# Patient Record
Sex: FEMALE | URBAN - METROPOLITAN AREA
[De-identification: names, ages, dates, MRNs, and addresses within clinical notes are randomized per-mention and may not be internally consistent; named-entity substitution may affect disease eponyms.]

---

## 2020-03-20 ENCOUNTER — HOSPITAL ENCOUNTER (EMERGENCY)
Facility: HOSPITAL | Age: 40
Discharge: HOME | End: 2020-03-20
Attending: EMERGENCY MEDICINE | Admitting: EMERGENCY MEDICINE
Payer: COMMERCIAL

## 2020-03-20 VITALS
TEMPERATURE: 97.9 F | DIASTOLIC BLOOD PRESSURE: 64 MMHG | HEART RATE: 70 BPM | OXYGEN SATURATION: 99 % | SYSTOLIC BLOOD PRESSURE: 108 MMHG | RESPIRATION RATE: 16 BRPM

## 2020-03-20 DIAGNOSIS — J06.9 VIRAL URI: Primary | ICD-10-CM

## 2020-03-20 PROCEDURE — 99283 EMERGENCY DEPT VISIT LOW MDM: CPT | Performed by: PHYSICIAN ASSISTANT

## 2020-03-20 PROCEDURE — 99283 EMERGENCY DEPT VISIT LOW MDM: CPT

## 2020-03-21 NOTE — DISCHARGE INSTRUCTIONS
[address]    Your healthcare provider and/or public health staff have evaluated you and have determined that you do not need to be hospitalized at this time  At this time you can be isolated at home where you will be monitored by staff from your local or state health department  You should carefully follow the prevention and isolation steps below until a healthcare provider or local or state health department says that you can return to your normal activities  Stay home except to get medical care    People who are mildly ill with COVID-19 are able to isolate at home during their illness  You should restrict activities outside your home, except for getting medical care  Do not go to work, school, or public areas  Avoid using public transportation, ride-sharing, or taxis  Separate yourself from other people and animals in your home    People: As much as possible, you should stay in a specific room and away from other people in your home  Also, you should use a separate bathroom, if available  Animals: You should restrict contact with pets and other animals while you are sick with COVID-19, just like you would around other people  Although there have not been reports of pets or other animals becoming sick with COVID-19, it is still recommended that people sick with COVID-19 limit contact with animals until more information is known about the virus  When possible, have another member of your household care for your animals while you are sick  If you are sick with COVID-19, avoid contact with your pet, including petting, snuggling, being kissed or licked, and sharing food  If you must care for your pet or be around animals while you are sick, wash your hands before and after you interact with pets and wear a facemask  See COVID-19 and Animals for more information      Call ahead before visiting your doctor    If you have a medical appointment, call the healthcare provider and tell them that you have or may have COVID-19  This will help the healthcare providers office take steps to keep other people from getting infected or exposed  Wear a facemask    You should wear a facemask when you are around other people (e g , sharing a room or vehicle) or pets and before you enter a healthcare providers office  If you are not able to wear a facemask (for example, because it causes trouble breathing), then people who live with you should not stay in the same room with you, or they should wear a facemask if they enter your room  Cover your coughs and sneezes    Cover your mouth and nose with a tissue when you cough or sneeze  Throw used tissues in a lined trash can  Immediately wash your hands with soap and water for at least 20 seconds or, if soap and water are not available, clean your hands with an alcohol-based hand  that contains at least 60% alcohol  Clean your hands often    Wash your hands often with soap and water for at least 20 seconds, especially after blowing your nose, coughing, or sneezing; going to the bathroom; and before eating or preparing food  If soap and water are not readily available, use an alcohol-based hand  with at least 60% alcohol, covering all surfaces of your hands and rubbing them together until they feel dry  Soap and water are the best option if hands are visibly dirty  Avoid touching your eyes, nose, and mouth with unwashed hands  Avoid sharing personal household items    You should not share dishes, drinking glasses, cups, eating utensils, towels, or bedding with other people or pets in your home  After using these items, they should be washed thoroughly with soap and water  Clean all high-touch surfaces everyday    High touch surfaces include counters, tabletops, doorknobs, bathroom fixtures, toilets, phones, keyboards, tablets, and bedside tables  Also, clean any surfaces that may have blood, stool, or body fluids on them   Use a household cleaning spray or wipe, according to the label instructions  Labels contain instructions for safe and effective use of the cleaning product including precautions you should take when applying the product, such as wearing gloves and making sure you have good ventilation during use of the product  Monitor your symptoms    Seek prompt medical attention if your illness is worsening (e g , difficulty breathing)  Before seeking care, call your healthcare provider and tell them that you have, or are being evaluated for, COVID-19  Put on a facemask before you enter the facility  These steps will help the healthcare providers office to keep other people in the office or waiting room from getting infected or exposed  Ask your healthcare provider to call the local or state health department  Persons who are placed under active monitoring or facilitated self-monitoring should follow instructions provided by their local health department or occupational health professionals, as appropriate  If you have a medical emergency and need to call 911, notify the dispatch personnel that you have, or are being evaluated for COVID-19  If possible, put on a facemask before emergency medical services arrive  Discontinuing home isolation    Patients with confirmed COVID-19 should remain under home isolation precautions until the risk of secondary transmission to others is thought to be low  The decision to discontinue home isolation precautions should be made on a case-by-case basis, in consultation with healthcare providers and state and local health departments      Source: [employer]

## 2020-03-21 NOTE — ED PROVIDER NOTES
History  Chief Complaint   Patient presents with    Medical Problem      tested positive for covid 19, pt asymptomatic, no fever, sob, congestion, cough  Pt does not have any complaints  45 yo here because her  tested positive for covid19  She is asymptomatic but came in "to be safe"  No fever, chills, cough, sob  History provided by:  Patient   used: No    Medical Problem   Quality:   tested positive for covid 19  Severity:  Mild  Onset quality:  Sudden  Duration:  1 day  Timing:  Constant  Progression:  Unchanged  Chronicity:  New  Associated symptoms: no abdominal pain, no chest pain, no congestion, no cough, no diarrhea, no fatigue, no fever, no headaches, no myalgias, no nausea, no rash, no rhinorrhea, no shortness of breath, no sore throat, no vomiting and no wheezing        Cannot display prior to admission medications because the patient has not been admitted in this contact  History reviewed  No pertinent past medical history  History reviewed  No pertinent surgical history  History reviewed  No pertinent family history  I have reviewed and agree with the history as documented  E-Cigarette/Vaping    E-Cigarette Use Never User      E-Cigarette/Vaping Substances     Social History     Tobacco Use    Smoking status: Never Smoker    Smokeless tobacco: Never Used   Substance Use Topics    Alcohol use: Not Currently    Drug use: Never       Review of Systems   Constitutional: Negative for activity change, appetite change, chills, diaphoresis, fatigue, fever and unexpected weight change  HENT: Negative for congestion, rhinorrhea, sinus pressure, sore throat and trouble swallowing  Eyes: Negative for photophobia and visual disturbance  Respiratory: Negative for apnea, cough, choking, chest tightness, shortness of breath, wheezing and stridor  Cardiovascular: Negative for chest pain, palpitations and leg swelling     Gastrointestinal: Negative for abdominal distention, abdominal pain, blood in stool, constipation, diarrhea, nausea and vomiting  Genitourinary: Negative for decreased urine volume, difficulty urinating, dysuria, enuresis, flank pain, frequency, hematuria and urgency  Musculoskeletal: Negative for arthralgias, myalgias, neck pain and neck stiffness  Skin: Negative for color change, pallor, rash and wound  Allergic/Immunologic: Negative  Neurological: Negative for dizziness, tremors, syncope, weakness, light-headedness, numbness and headaches  Hematological: Negative  Psychiatric/Behavioral: Negative  All other systems reviewed and are negative  Physical Exam  Physical Exam   Constitutional: She is oriented to person, place, and time  She appears well-developed and well-nourished  Non-toxic appearance  She does not have a sickly appearance  She does not appear ill  No distress  HENT:   Head: Normocephalic and atraumatic  Eyes: Pupils are equal, round, and reactive to light  EOM and lids are normal    Neck: Normal range of motion  Neck supple  Cardiovascular: Normal rate, regular rhythm, S1 normal, S2 normal, normal heart sounds, intact distal pulses and normal pulses  Exam reveals no gallop, no distant heart sounds, no friction rub and no decreased pulses  No murmur heard  Pulses:       Radial pulses are 2+ on the right side, and 2+ on the left side  Pulmonary/Chest: Effort normal and breath sounds normal  No accessory muscle usage  No apnea, no tachypnea and no bradypnea  No respiratory distress  She has no decreased breath sounds  She has no wheezes  She has no rhonchi  She has no rales  Abdominal: Soft  Normal appearance  She exhibits no distension  There is no tenderness  There is no rigidity, no rebound and no guarding  Musculoskeletal: Normal range of motion  She exhibits no edema, tenderness or deformity  Neurological: She is alert and oriented to person, place, and time   No cranial nerve deficit  GCS eye subscore is 4  GCS verbal subscore is 5  GCS motor subscore is 6  Skin: Skin is warm, dry and intact  No rash noted  She is not diaphoretic  No erythema  No pallor  Psychiatric: Her speech is normal    Nursing note and vitals reviewed  Vital Signs  ED Triage Vitals [03/20/20 2056]   Temperature Pulse Respirations Blood Pressure SpO2   97 9 °F (36 6 °C) 70 16 108/64 99 %      Temp Source Heart Rate Source Patient Position - Orthostatic VS BP Location FiO2 (%)   Oral Monitor Sitting Left arm --      Pain Score       --           Vitals:    03/20/20 2056   BP: 108/64   Pulse: 70   Patient Position - Orthostatic VS: Sitting         Visual Acuity      ED Medications  Medications - No data to display    Diagnostic Studies  Results Reviewed     None                 No orders to display              Procedures  Procedures         ED Course                                 MDM  Number of Diagnoses or Management Options  Viral URI: new and requires workup  Diagnosis management comments: ddx includes but is not limited to viral syndrome, flu, pneumonia, covid19  Risk of Complications, Morbidity, and/or Mortality  Presenting problems: low  Management options: low  General comments: Plan/MDM: 45 yo female with exposure to covid 19  She is asymptomatic  Explained to her that we will not be testing at this time as she likely has been exposed and that if she develops symptoms clinically it would likely be covid 19 and she should treat it conservatively and come to ER if sob/dyspnea  Return parameters provided  Pt understands and agrees with plan        Patient Progress  Patient progress: stable        Disposition  Final diagnoses:   Viral URI     Time reflects when diagnosis was documented in both MDM as applicable and the Disposition within this note     Time User Action Codes Description Comment    3/20/2020  8:53 PM Zahira Silverio Add [J06 9,  B97 89] Viral URI with cough     3/20/2020  8:53 PM Geena Styles Remove [J06 9,  B97 89] Viral URI with cough     3/20/2020  8:53 PM Geena Styles Add [J06 9] Viral URI       ED Disposition     ED Disposition Condition Date/Time Comment    Discharge Stable Fri Mar 20, 2020  8:53 PM [address] discharge to home/self care  Follow-up Information     Follow up With Specialties Details Why Contact Info Additional [address] [facility] Emergency Department Emergency Medicine Go to  if short of breath [address] [facility] [address] ED, [address] [place] [address], [facility]          Patient's Medications    No medications on file     No discharge procedures on file      PDMP Review     None          ED Provider  Electronically Signed by           Elroy Cortez PA-C  03/20/20 0536

## 2020-07-23 ENCOUNTER — OFFICE VISIT (OUTPATIENT)
Dept: URGENT CARE | Facility: MEDICAL CENTER | Age: 40
End: 2020-07-23
Payer: COMMERCIAL

## 2020-07-23 DIAGNOSIS — Z20.822 EXPOSURE TO COVID-19 VIRUS: Primary | ICD-10-CM

## 2020-07-23 PROCEDURE — 99213 OFFICE O/P EST LOW 20 MIN: CPT | Performed by: PHYSICIAN ASSISTANT

## 2020-07-23 PROCEDURE — U0003 INFECTIOUS AGENT DETECTION BY NUCLEIC ACID (DNA OR RNA); SEVERE ACUTE RESPIRATORY SYNDROME CORONAVIRUS 2 (SARS-COV-2) (CORONAVIRUS DISEASE [COVID-19]), AMPLIFIED PROBE TECHNIQUE, MAKING USE OF HIGH THROUGHPUT TECHNOLOGIES AS DESCRIBED BY CMS-2020-01-R: HCPCS | Performed by: PHYSICIAN ASSISTANT

## 2020-07-23 NOTE — PATIENT INSTRUCTIONS
1  Quarantine until test results available  2  Increase fluids  3  Test results available in 2-7 days

## 2020-07-23 NOTE — PROGRESS NOTES
[facility]        NAME: Yamileth Torres is a 44 y o  female  : 1980    MRN: [id number]  DATE: 2020  TIME: 6:53 PM    Assessment and Plan   Exposure to COVID-19 virus [Z20 828]  1  Exposure to COVID-19 virus  MISC COVID-19 TEST- Office Collection         Patient Instructions     1  Quarantine until test results available  2  Increase fluids  3  Test results available in 2-7 days  Chief Complaint   No chief complaint on file  History of Present Illness       Celeste is a 80-year-old female presents for COVID-19 testing  Patient reports she was exposed through her  who recently tested positive but has no current symptoms  Review of Systems   Review of Systems   Constitutional: Negative  HENT: Negative  Respiratory: Negative  Cardiovascular: Negative  Gastrointestinal: Negative  Current Medications     No current outpatient medications on file  Current Allergies     Allergies as of 2020    (No Known Allergies)            The following portions of the patient's history were reviewed and updated as appropriate: allergies, current medications, past family history, past medical history, past social history, past surgical history and problem list      No past medical history on file  No past surgical history on file  No family history on file  Medications have been verified  Objective   There were no vitals taken for this visit  Physical Exam     Physical Exam   Constitutional: She appears well-developed and well-nourished  No distress  HENT:   Head: Normocephalic and atraumatic  Right Ear: Tympanic membrane and ear canal normal    Left Ear: Tympanic membrane and ear canal normal    Nose: Nose normal    Mouth/Throat: Uvula is midline, oropharynx is clear and moist and mucous membranes are normal    Cardiovascular: Normal rate, regular rhythm and normal heart sounds  No murmur heard    Pulmonary/Chest: Effort normal and breath sounds normal

## 2020-07-26 ENCOUNTER — TELEPHONE (OUTPATIENT)
Dept: OTHER | Facility: OTHER | Age: 40
End: 2020-07-26

## 2020-07-26 LAB — SARS-COV-2 RNA SPEC QL NAA+PROBE: NOT DETECTED

## 2020-07-26 NOTE — TELEPHONE ENCOUNTER
Your test for COVID-19, also known as novel coronavirus, came back negative  You do not have COVID-19  If you have any additional questions, we can schedule a virtual visit for you with a provider or call the [phone] Option 7 for care advice  For additional information , please visit the Coronavirus FAQ on the [address] [address]  ([facility])  Patient was advised that she can print out her test results from My Chart which is pending  She denied any other questions

## 2021-04-08 DIAGNOSIS — Z23 ENCOUNTER FOR IMMUNIZATION: ICD-10-CM

## 2024-06-13 ENCOUNTER — HOSPITAL ENCOUNTER (EMERGENCY)
Facility: HOSPITAL | Age: 44
Discharge: HOME | End: 2024-06-13
Attending: EMERGENCY MEDICINE
Payer: COMMERCIAL

## 2024-06-13 ENCOUNTER — APPOINTMENT (EMERGENCY)
Dept: RADIOLOGY | Facility: HOSPITAL | Age: 44
End: 2024-06-13
Payer: COMMERCIAL

## 2024-06-13 VITALS
WEIGHT: 190 LBS | SYSTOLIC BLOOD PRESSURE: 116 MMHG | HEIGHT: 68 IN | TEMPERATURE: 97.7 F | BODY MASS INDEX: 28.79 KG/M2 | HEART RATE: 62 BPM | RESPIRATION RATE: 17 BRPM | OXYGEN SATURATION: 100 % | DIASTOLIC BLOOD PRESSURE: 54 MMHG

## 2024-06-13 DIAGNOSIS — R07.9 CHEST PAIN: ICD-10-CM

## 2024-06-13 DIAGNOSIS — R10.13 EPIGASTRIC PAIN: ICD-10-CM

## 2024-06-13 DIAGNOSIS — R71.0 DECREASED HEMOGLOBIN: ICD-10-CM

## 2024-06-13 DIAGNOSIS — M54.9 MUSCULOSKELETAL BACK PAIN: Primary | ICD-10-CM

## 2024-06-13 LAB
ALBUMIN SERPL BCP-MCNC: 4.1 G/DL (ref 3.5–5)
ALP SERPL-CCNC: 55 U/L (ref 34–104)
ALT SERPL W P-5'-P-CCNC: 11 U/L (ref 7–52)
ANION GAP SERPL CALCULATED.3IONS-SCNC: 3 MMOL/L (ref 4–13)
AST SERPL W P-5'-P-CCNC: 11 U/L (ref 13–39)
ATRIAL RATE: 53 BPM
BASOPHILS # BLD AUTO: 0.04 THOUSANDS/ÂΜL (ref 0–0.1)
BASOPHILS NFR BLD AUTO: 1 % (ref 0–1)
BILIRUB SERPL-MCNC: 0.24 MG/DL (ref 0.2–1)
BNP SERPL-MCNC: 19 PG/ML (ref 0–100)
BUN SERPL-MCNC: 12 MG/DL (ref 5–25)
CALCIUM SERPL-MCNC: 9 MG/DL (ref 8.4–10.2)
CARDIAC TROPONIN I PNL SERPL HS: <2 NG/L
CHLORIDE SERPL-SCNC: 106 MMOL/L (ref 96–108)
CO2 SERPL-SCNC: 27 MMOL/L (ref 21–32)
CREAT SERPL-MCNC: 0.74 MG/DL (ref 0.6–1.3)
EOSINOPHIL # BLD AUTO: 0.12 THOUSAND/ÂΜL (ref 0–0.61)
EOSINOPHIL NFR BLD AUTO: 2 % (ref 0–6)
ERYTHROCYTE [DISTWIDTH] IN BLOOD BY AUTOMATED COUNT: 20.1 % (ref 11.6–15.1)
EXT PREGNANCY TEST URINE: NEGATIVE
GFR SERPL CREATININE-BSD FRML MDRD: 99 ML/MIN/1.73SQ M
GLUCOSE SERPL-MCNC: 90 MG/DL (ref 65–140)
HCT VFR BLD AUTO: 24 % (ref 34.8–46.1)
HGB BLD-MCNC: 7.1 G/DL (ref 11.5–15.4)
IMM GRANULOCYTES # BLD AUTO: 0.01 THOUSAND/UL (ref 0–0.2)
IMM GRANULOCYTES NFR BLD AUTO: 0 % (ref 0–2)
LYMPHOCYTES # BLD AUTO: 1.45 THOUSANDS/ÂΜL (ref 0.6–4.47)
LYMPHOCYTES NFR BLD AUTO: 28 % (ref 14–44)
MCH RBC QN AUTO: 18.7 PG (ref 26.8–34.3)
MCHC RBC AUTO-ENTMCNC: 29.6 G/DL (ref 31.4–37.4)
MCV RBC AUTO: 63 FL (ref 82–98)
MONOCYTES # BLD AUTO: 0.58 THOUSAND/ÂΜL (ref 0.17–1.22)
MONOCYTES NFR BLD AUTO: 11 % (ref 4–12)
NEUTROPHILS # BLD AUTO: 2.96 THOUSANDS/ÂΜL (ref 1.85–7.62)
NEUTS SEG NFR BLD AUTO: 58 % (ref 43–75)
NRBC BLD AUTO-RTO: 0 /100 WBCS
P AXIS: 72 DEGREES
PLATELET # BLD AUTO: 380 THOUSANDS/UL (ref 149–390)
PMV BLD AUTO: 10.1 FL (ref 8.9–12.7)
POTASSIUM SERPL-SCNC: 3.8 MMOL/L (ref 3.5–5.3)
PR INTERVAL: 174 MS
PROT SERPL-MCNC: 7.2 G/DL (ref 6.4–8.4)
QRS AXIS: 79 DEGREES
QRSD INTERVAL: 92 MS
QT INTERVAL: 428 MS
QTC INTERVAL: 401 MS
RBC # BLD AUTO: 3.8 MILLION/UL (ref 3.81–5.12)
SODIUM SERPL-SCNC: 136 MMOL/L (ref 135–147)
T WAVE AXIS: 65 DEGREES
VENTRICULAR RATE: 53 BPM
WBC # BLD AUTO: 5.16 THOUSAND/UL (ref 4.31–10.16)

## 2024-06-13 PROCEDURE — 93005 ELECTROCARDIOGRAM TRACING: CPT

## 2024-06-13 PROCEDURE — 96361 HYDRATE IV INFUSION ADD-ON: CPT

## 2024-06-13 PROCEDURE — C9113 INJ PANTOPRAZOLE SODIUM, VIA: HCPCS

## 2024-06-13 PROCEDURE — 80053 COMPREHEN METABOLIC PANEL: CPT

## 2024-06-13 PROCEDURE — 83880 ASSAY OF NATRIURETIC PEPTIDE: CPT

## 2024-06-13 PROCEDURE — 96374 THER/PROPH/DIAG INJ IV PUSH: CPT

## 2024-06-13 PROCEDURE — 96375 TX/PRO/DX INJ NEW DRUG ADDON: CPT

## 2024-06-13 PROCEDURE — 71046 X-RAY EXAM CHEST 2 VIEWS: CPT

## 2024-06-13 PROCEDURE — 81025 URINE PREGNANCY TEST: CPT

## 2024-06-13 PROCEDURE — 99285 EMERGENCY DEPT VISIT HI MDM: CPT

## 2024-06-13 PROCEDURE — 84484 ASSAY OF TROPONIN QUANT: CPT

## 2024-06-13 PROCEDURE — 99284 EMERGENCY DEPT VISIT MOD MDM: CPT

## 2024-06-13 PROCEDURE — 85025 COMPLETE CBC W/AUTO DIFF WBC: CPT

## 2024-06-13 PROCEDURE — 36415 COLL VENOUS BLD VENIPUNCTURE: CPT

## 2024-06-13 PROCEDURE — 93010 ELECTROCARDIOGRAM REPORT: CPT | Performed by: INTERNAL MEDICINE

## 2024-06-13 RX ORDER — PANTOPRAZOLE SODIUM 40 MG/10ML
40 INJECTION, POWDER, LYOPHILIZED, FOR SOLUTION INTRAVENOUS ONCE
Status: COMPLETED | OUTPATIENT
Start: 2024-06-13 | End: 2024-06-13

## 2024-06-13 RX ORDER — FAMOTIDINE 10 MG/ML
20 INJECTION, SOLUTION INTRAVENOUS ONCE
Status: COMPLETED | OUTPATIENT
Start: 2024-06-13 | End: 2024-06-13

## 2024-06-13 RX ORDER — PANTOPRAZOLE SODIUM 20 MG/1
20 TABLET, DELAYED RELEASE ORAL DAILY
Qty: 5 TABLET | Refills: 0 | Status: SHIPPED | OUTPATIENT
Start: 2024-06-13 | End: 2024-06-18

## 2024-06-13 RX ORDER — FAMOTIDINE 20 MG/1
20 TABLET, FILM COATED ORAL 2 TIMES DAILY
Qty: 10 TABLET | Refills: 0 | Status: SHIPPED | OUTPATIENT
Start: 2024-06-13 | End: 2024-06-18

## 2024-06-13 RX ORDER — MAGNESIUM HYDROXIDE/ALUMINUM HYDROXICE/SIMETHICONE 120; 1200; 1200 MG/30ML; MG/30ML; MG/30ML
15 SUSPENSION ORAL ONCE
Status: COMPLETED | OUTPATIENT
Start: 2024-06-13 | End: 2024-06-13

## 2024-06-13 RX ORDER — LIDOCAINE 50 MG/G
1 PATCH TOPICAL DAILY
Qty: 5 PATCH | Refills: 0 | Status: SHIPPED | OUTPATIENT
Start: 2024-06-13 | End: 2024-06-18

## 2024-06-13 RX ORDER — LIDOCAINE 50 MG/G
1 PATCH TOPICAL ONCE
Status: DISCONTINUED | OUTPATIENT
Start: 2024-06-13 | End: 2024-06-13 | Stop reason: HOSPADM

## 2024-06-13 RX ORDER — TIZANIDINE HYDROCHLORIDE 2 MG/1
2 CAPSULE, GELATIN COATED ORAL 3 TIMES DAILY
Qty: 9 CAPSULE | Refills: 0 | Status: SHIPPED | OUTPATIENT
Start: 2024-06-13 | End: 2024-06-16

## 2024-06-13 RX ADMIN — LIDOCAINE 1 PATCH: 50 PATCH TOPICAL at 01:49

## 2024-06-13 RX ADMIN — PANTOPRAZOLE SODIUM 40 MG: 40 INJECTION, POWDER, FOR SOLUTION INTRAVENOUS at 01:58

## 2024-06-13 RX ADMIN — FAMOTIDINE 20 MG: 10 INJECTION, SOLUTION INTRAVENOUS at 02:01

## 2024-06-13 RX ADMIN — SODIUM CHLORIDE 1000 ML: 0.9 INJECTION, SOLUTION INTRAVENOUS at 02:00

## 2024-06-13 RX ADMIN — ALUMINUM HYDROXIDE, MAGNESIUM HYDROXIDE, DIMETHICONE 15 ML: 400; 400; 40 SUSPENSION ORAL at 01:50

## 2024-06-13 NOTE — ED PROVIDER NOTES
"History  Chief Complaint   Patient presents with    Back Pain     Back pain x 2 weeks now radiating into chest whenever she is laying flat. States it is worse when eating/drinking \"it is painful going down\".      Patient is a 43-year-old female who presents to the emergency room for chest and back pain.  Patient reports doing yard work a few weeks ago, reports musculoskeletal back pain.  Denies trauma or injury.  Reports chest pain for the past few days.  Describes chest pain as sharp to her midsternal region.  Reports pain is worse at night.  Described as a burning sensation.  Denies any other symptoms.  Had ibuprofen this morning.          None       History reviewed. No pertinent past medical history.    History reviewed. No pertinent surgical history.    History reviewed. No pertinent family history.  I have reviewed and agree with the history as documented.    E-Cigarette/Vaping    E-Cigarette Use Never User      E-Cigarette/Vaping Substances     Social History     Tobacco Use    Smoking status: Never    Smokeless tobacco: Never   Vaping Use    Vaping status: Never Used   Substance Use Topics    Alcohol use: Not Currently    Drug use: Never       Review of Systems   Constitutional:  Negative for chills and fever.   HENT:  Negative for ear pain, sore throat, trouble swallowing and voice change.    Eyes:  Negative for pain and visual disturbance.   Respiratory:  Negative for cough and shortness of breath.    Cardiovascular:  Positive for chest pain. Negative for palpitations.   Gastrointestinal:  Negative for abdominal pain, nausea and vomiting.   Genitourinary:  Negative for dysuria, flank pain and hematuria.   Musculoskeletal:  Positive for back pain. Negative for arthralgias and gait problem.   Skin:  Negative for color change and rash.   Neurological:  Negative for seizures, syncope and headaches.   Psychiatric/Behavioral:  Negative for confusion.    All other systems reviewed and are negative.      Physical " Exam  Physical Exam  Vitals and nursing note reviewed.   Constitutional:       General: She is not in acute distress.     Appearance: She is well-developed.   HENT:      Head: Normocephalic and atraumatic.   Eyes:      Conjunctiva/sclera: Conjunctivae normal.   Cardiovascular:      Rate and Rhythm: Normal rate and regular rhythm.      Pulses: Normal pulses.      Heart sounds: No murmur heard.  Pulmonary:      Effort: Pulmonary effort is normal. No respiratory distress.      Breath sounds: Normal breath sounds.   Abdominal:      Palpations: Abdomen is soft.      Tenderness: There is abdominal tenderness in the epigastric area.   Musculoskeletal:         General: No swelling.        Arms:       Cervical back: Neck supple.   Skin:     General: Skin is warm and dry.      Capillary Refill: Capillary refill takes less than 2 seconds.   Neurological:      Mental Status: She is alert.   Psychiatric:         Mood and Affect: Mood normal.         Vital Signs  ED Triage Vitals [06/13/24 0103]   Temperature Pulse Respirations Blood Pressure SpO2   97.7 °F (36.5 °C) 62 17 116/54 100 %      Temp Source Heart Rate Source Patient Position - Orthostatic VS BP Location FiO2 (%)   Oral Monitor Sitting Left arm --      Pain Score       --           Vitals:    06/13/24 0103   BP: 116/54   Pulse: 62   Patient Position - Orthostatic VS: Sitting         Visual Acuity      ED Medications  Medications   lidocaine (LIDODERM) 5 % patch 1 patch (1 patch Topical Medication Applied 6/13/24 0149)   sodium chloride 0.9 % bolus 1,000 mL (0 mL Intravenous Stopped 6/13/24 0349)   Famotidine (PF) (PEPCID) injection 20 mg (20 mg Intravenous Given 6/13/24 0201)   pantoprazole (PROTONIX) injection 40 mg (40 mg Intravenous Given 6/13/24 0158)   aluminum-magnesium hydroxide-simethicone (MAALOX) oral suspension 15 mL (15 mL Oral Given 6/13/24 0150)       Diagnostic Studies  Results Reviewed       Procedure Component Value Units Date/Time    HS Troponin 0hr  (reflex protocol) [[id number]]  (Normal) Collected: 06/13/24 0156    Lab Status: Final result Specimen: Blood from Arm, Right Updated: 06/13/24 0235     hs TnI 0hr <2 ng/L     B-Type Natriuretic Peptide(BNP) [[id number]]  (Normal) Collected: 06/13/24 0156    Lab Status: Final result Specimen: Blood from Arm, Right Updated: 06/13/24 0235     BNP 19 pg/mL     Comprehensive metabolic panel [[id number]]  (Abnormal) Collected: 06/13/24 0156    Lab Status: Final result Specimen: Blood from Arm, Right Updated: 06/13/24 0228     Sodium 136 mmol/L      Potassium 3.8 mmol/L      Chloride 106 mmol/L      CO2 27 mmol/L      ANION GAP 3 mmol/L      BUN 12 mg/dL      Creatinine 0.74 mg/dL      Glucose 90 mg/dL      Calcium 9.0 mg/dL      AST 11 U/L      ALT 11 U/L      Alkaline Phosphatase 55 U/L      Total Protein 7.2 g/dL      Albumin 4.1 g/dL      Total Bilirubin 0.24 mg/dL      eGFR 99 ml/min/1.73sq m     Narrative:      National Kid[employer] Foundation guidelines for Chronic Kidney Disease (CKD):     Stage 1 with normal or high GFR (GFR > 90 mL/min/1.73 square meters)    Stage 2 Mild CKD (GFR = 60-89 mL/min/1.73 square meters)    Stage 3A Moderate CKD (GFR = 45-59 mL/min/1.73 square meters)    Stage 3B Moderate CKD (GFR = 30-44 mL/min/1.73 square meters)    Stage 4 Severe CKD (GFR = 15-29 mL/min/1.73 square meters)    Stage 5 End Stage CKD (GFR <15 mL/min/1.73 square meters)  Note: GFR calculation is accurate only with a steady state creatinine    CBC and differential [[id number]]  (Abnormal) Collected: 06/13/24 0156    Lab Status: Final result Specimen: Blood from Arm, Right Updated: 06/13/24 0210     WBC 5.16 Thousand/uL      RBC 3.80 Million/uL      Hemoglobin 7.1 g/dL      Hematocrit 24.0 %      MCV 63 fL      MCH 18.7 pg      MCHC 29.6 g/dL      RDW 20.1 %      MPV 10.1 fL      Platelets 380 Thousands/uL      nRBC 0 /100 WBCs      Segmented % 58 %      Immature Grans % 0 %      Lymphocytes % 28 %      Monocytes % 11 %       Eosinophils Relative 2 %      Basophils Relative 1 %      Absolute Neutrophils 2.96 Thousands/µL      Absolute Immature Grans 0.01 Thousand/uL      Absolute Lymphocytes 1.45 Thousands/µL      Absolute Monocytes 0.58 Thousand/µL      Eosinophils Absolute 0.12 Thousand/µL      Basophils Absolute 0.04 Thousands/µL     POCT pregnancy, urine [[id number]]  (Normal) Resulted: 06/13/24 0201    Lab Status: Final result Updated: 06/13/24 0201     EXT Preg Test, Ur Negative     Control --                   XR chest 2 views   ED Interpretation by Kaylee Sanches PA-C (06/13 0232)   No acute cardiopulmonary abnormality                 Procedures  ECG 12 Lead Documentation Only    Date/Time: 6/13/2024 1:12 AM    Performed by: Kaylee Sanches PA-C  Authorized by: Kaylee Sanches PA-C    Indications / Diagnosis:  Cardiac work-up  ECG reviewed by me, the ED Provider: yes    Patient location:  ED  Interpretation:     Interpretation: non-specific    Rate:     ECG rate:  53    ECG rate assessment: bradycardic    Rhythm:     Rhythm: sinus bradycardia    Conduction:     Conduction: abnormal      Abnormal conduction: incomplete RBBB    ST segments:     ST segments:  Normal  T waves:     T waves: non-specific             ED Course  ED Course as of 06/13/24 0452   Thu Jun 13, 2024   0210 Hemoglobin(!): 7.1  Patient reports she has a history of anemia.  Patient reports she finished her menses yesterday.  Denies any hemoptysis, hematemesis, bright red or black stools.   0348 Symptom improvement on reevaluation.             HEART Risk Score      Flowsheet Row Most Recent Value   Heart Score Risk Calculator    History 0 Filed at: 06/13/2024 0200   ECG 0 Filed at: 06/13/2024 0200   Age 0 Filed at: 06/13/2024 0200   Risk Factors 1 Filed at: 06/13/2024 0200   Troponin 0 Filed at: 06/13/2024 0200   HEART Score 1 Filed at: 06/13/2024 0200                          SBIRT 22yo+      Flowsheet Row Most Recent Value   Initial Alcohol Screen: US  AUDIT-C     1. How often do you have a drink containing alcohol? 0 Filed at: 06/13/2024 0105   2. How many drinks containing alcohol do you have on a typical day you are drinking?  0 Filed at: 06/13/2024 0105   3b. FEMALE Any Age, or MALE 65+: How often do you have 4 or more drinks on one occassion? 0 Filed at: 06/13/2024 0105   Audit-C Score 0 Filed at: 06/13/2024 0105   SAMIA: How many times in the past year have you...    Used an illegal drug or used a prescription medication for non-medical reasons? Never Filed at: 06/13/2024 0105                      Medical Decision Making  43-year-old female presenting for chest pain described as a burning sensation to her midsternal region that is worse at night.  Associated musculoskeletal back pain after doing yard work.  Vital signs stable throughout ED course.  Epigastric tenderness on physical exam.  Lumbar right-sided muscular tenderness.  No other acute physical exam findings.  Negative cardiac workup.  Hemoglobin 7.1, however, patient reports history of anemia and recent menses.  Patient denies any signs and symptoms of active bleeding.  Educated patient to follow-up with her PCP for today's visit and hemoglobin.  Symptom improvement after fluids, Pepcid, Protonix, Maalox and Lidoderm patch.  Patient given prescriptions for outpatient use.  Patient to follow-up with her PCP and return to the emergency room for any worsening symptoms.  Discussed return precautions.  Patient understands and agrees with discharge plan.    Amount and/or Complexity of Data Reviewed  Labs: ordered. Decision-making details documented in ED Course.  Radiology: ordered and independent interpretation performed.    Risk  OTC drugs.  Prescription drug management.             Disposition  Final diagnoses:   Musculoskeletal back pain   Chest pain   Decreased hemoglobin   Epigastric pain     Time reflects when diagnosis was documented in both MDM as applicable and the Disposition within this note        Time User Action Codes Description Comment    6/13/2024  3:49 AM Kaylee Sanches Add [M54.9] Musculoskeletal back pain     6/13/2024  3:49 AM Kaylee Sanches Add [R07.9] Chest pain     6/13/2024  3:49 AM Kaylee Sanches Add [R71.0] Decreased hemoglobin     6/13/2024  3:50 AM Kaylee Sanches Add [R10.13] Epigastric pain           ED Disposition       ED Disposition   Discharge    Condition   Stable    Date/Time   Thu Jun 13, 2024 0349    Comment   Celeste Coffman discharge to home/self care.                   Follow-up Information       Follow up With Specialties Details Why Contact Info Additional Information    [facility]   [address] [place] [address]  [address]  [facility] [address] [phone]       [facility] Emergency Medicine Go to  If symptoms worsen [address] [facility] [address] [phone] [facility], [address] [place], [place], [address]            Discharge Medication List as of 6/13/2024  3:52 AM        START taking these medications    Details   famotidine (PEPCID) 20 mg tablet Take 1 tablet (20 mg total) by mouth 2 (two) times a day for 5 days, Starting Thu 6/13/2024, Until Tue 6/18/2024, Normal      lidocaine (Lidoderm) 5 % Apply 1 patch topically over 12 hours daily for 5 days Remove & Discard patch within 12 hours or as directed by MD, Starting Thu 6/13/2024, Until Tue 6/18/2024, Normal      pantoprazole (PROTONIX) 20 mg tablet Take 1 tablet (20 mg total) by mouth daily for 5 days, Starting Thu 6/13/2024, Until Tue 6/18/2024, Normal      TiZANidine (ZANAFLEX) 2 MG capsule Take 1 capsule (2 mg total) by mouth 3 (three) times a day for 3 days, Starting Thu 6/13/2024, Until Sun 6/16/2024, Normal             No discharge procedures on file.    PDMP Review       None            ED Provider  Electronically Signed by             Kaylee Sanches  SHERMAN  06/13/24 0452

## 2024-06-13 NOTE — Clinical Note
Celeste Coffman was seen and treated in our emergency department on 6/13/2024.                Diagnosis:     Celeste  .    She may return on this date: 06/14/2024         If you have any questions or concerns, please don't hesitate to call.      Kaylee Sanches PA-C    ______________________________           _______________          _______________  Hospital Representative                              Date                                Time

## 2024-06-13 NOTE — DISCHARGE INSTRUCTIONS
Pepcid and Protonix as prescribed for chest pain/epigastric pain.  Lidocaine patches as needed for back pain.  Tizanidine (muscle relaxer) as needed for back pain.    Follow-up with your primary care provider and return to the emergency room for any worsening symptoms.

## 2024-07-04 ENCOUNTER — HOSPITAL ENCOUNTER (EMERGENCY)
Facility: HOSPITAL | Age: 44
Discharge: HOME | End: 2024-07-04
Attending: EMERGENCY MEDICINE
Payer: COMMERCIAL

## 2024-07-04 VITALS
SYSTOLIC BLOOD PRESSURE: 113 MMHG | BODY MASS INDEX: 29.33 KG/M2 | DIASTOLIC BLOOD PRESSURE: 50 MMHG | HEART RATE: 94 BPM | RESPIRATION RATE: 16 BRPM | OXYGEN SATURATION: 100 % | WEIGHT: 192.9 LBS | TEMPERATURE: 98.5 F

## 2024-07-04 DIAGNOSIS — L02.91 ABSCESS: Primary | ICD-10-CM

## 2024-07-04 PROCEDURE — 99284 EMERGENCY DEPT VISIT MOD MDM: CPT | Performed by: EMERGENCY MEDICINE

## 2024-07-04 PROCEDURE — 99283 EMERGENCY DEPT VISIT LOW MDM: CPT

## 2024-07-04 PROCEDURE — 10061 I&D ABSCESS COMP/MULTIPLE: CPT | Performed by: EMERGENCY MEDICINE

## 2024-07-04 RX ORDER — CEPHALEXIN 500 MG/1
500 CAPSULE ORAL EVERY 6 HOURS SCHEDULED
Qty: 28 CAPSULE | Refills: 0 | Status: SHIPPED | OUTPATIENT
Start: 2024-07-04 | End: 2024-07-04

## 2024-07-04 RX ORDER — LIDOCAINE HYDROCHLORIDE 10 MG/ML
10 INJECTION, SOLUTION EPIDURAL; INFILTRATION; INTRACAUDAL; PERINEURAL ONCE
Status: COMPLETED | OUTPATIENT
Start: 2024-07-04 | End: 2024-07-04

## 2024-07-04 RX ORDER — CEPHALEXIN 500 MG/1
500 CAPSULE ORAL EVERY 6 HOURS SCHEDULED
Qty: 28 CAPSULE | Refills: 0 | Status: SHIPPED | OUTPATIENT
Start: 2024-07-04 | End: 2024-07-10

## 2024-07-04 RX ADMIN — LIDOCAINE HYDROCHLORIDE 10 ML: 10 INJECTION, SOLUTION EPIDURAL; INFILTRATION; INTRACAUDAL; PERINEURAL at 15:07

## 2024-07-04 NOTE — DISCHARGE INSTRUCTIONS
Can wash in the shower or soak in the tub  Wound will slowly close  Watch for increasing redness swelling signs of infection  Return any worsening symptoms

## 2024-07-04 NOTE — ED PROVIDER NOTES
"History  Chief Complaint   Patient presents with    Abscess     Pt c/o \"abscess to posterior R knee that she noticed yesterday, denies fevers, denies discharge.\"     HPI is a 43-year-old female she reports behind her right knee she has redness and irritation.  She reports a fullness there and believes she has an abscess.  Patient denies any trauma.  Denies any fever or chills.  She reports an area of redness primarily and a popliteal fossa.  Past medical history previously healthy  Family is noncontributory no social history, non-smoker no history of drug abuse    Prior to Admission Medications   Prescriptions Last Dose Informant Patient Reported? Taking?   TiZANidine (ZANAFLEX) 2 MG capsule   No No   Sig: Take 1 capsule (2 mg total) by mouth 3 (three) times a day for 3 days   famotidine (PEPCID) 20 mg tablet   No No   Sig: Take 1 tablet (20 mg total) by mouth 2 (two) times a day for 5 days   lidocaine (Lidoderm) 5 %   No No   Sig: Apply 1 patch topically over 12 hours daily for 5 days Remove & Discard patch within 12 hours or as directed by MD   pantoprazole (PROTONIX) 20 mg tablet   No No   Sig: Take 1 tablet (20 mg total) by mouth daily for 5 days      Facility-Administered Medications: None       History reviewed. No pertinent past medical history.    History reviewed. No pertinent surgical history.    History reviewed. No pertinent family history.  I have reviewed and agree with the history as documented.    E-Cigarette/Vaping    E-Cigarette Use Never User      E-Cigarette/Vaping Substances     Social History     Tobacco Use    Smoking status: Never    Smokeless tobacco: Never   Vaping Use    Vaping status: Never Used   Substance Use Topics    Alcohol use: Not Currently    Drug use: Never       Review of Systems   Constitutional:  Negative for chills and fever.   Skin:  Positive for rash.       Physical Exam  Physical Exam  Constitutional:       Appearance: Normal appearance.   Skin:     Comments: Behind the " patient's right knee there is an area of redness and induration approximately 2 x 3 cm there are some  follicular changes of the skin with some pimple type material and then there is redness and fullness below the skin, there is some fluctuance consistent with an abscess.   Neurological:      Mental Status: She is alert.         Vital Signs  ED Triage Vitals [07/04/24 1440]   Temperature Pulse Respirations Blood Pressure SpO2   98.5 °F (36.9 °C) 94 16 113/50 100 %      Temp Source Heart Rate Source Patient Position - Orthostatic VS BP Location FiO2 (%)   Temporal Monitor -- Left arm --      Pain Score       --           Vitals:    07/04/24 1440   BP: 113/50   Pulse: 94         Visual Acuity      ED Medications  Medications   lidocaine (PF) (XYLOCAINE-MPF) 1 % injection 10 mL (10 mL Infiltration Given by Other 7/4/24 1507)       Diagnostic Studies  Results Reviewed       None                   No orders to display              Procedures  Incision and drain    Date/Time: 7/4/2024 3:10 PM    Performed by: Gage Wilson MD  Authorized by: Gage Wilson MD  Universal Protocol:  Consent: Verbal consent obtained.    Patient location:  ED  Location:     Type:  Abscess    Location:  Lower extremity    Lower extremity location:  R leg  Pre-procedure details:     Skin preparation:  Betadine  Anesthesia (see MAR for exact dosages):     Anesthesia method:  Local infiltration    Local anesthetic:  Lidocaine 1% w/o epi  Procedure details:     Complexity:  Simple    Incision types:  Single straight    Scalpel blade:  11    Approach:  Open    Incision depth:  Subcutaneous    Wound management:  Probed and deloculated    Drainage:  Purulent    Drainage amount:  Scant    Wound treatment:  Wound left open  Post-procedure details:     Patient tolerance of procedure:  Tolerated well, no immediate complications           ED Course                               SBIRT 20yo+      Flowsheet Row Most Recent Value   Initial Alcohol Screen: US  AUDIT-C     1. How often do you have a drink containing alcohol? 0 Filed at: 07/04/2024 1443   2. How many drinks containing alcohol do you have on a typical day you are drinking?  0 Filed at: 07/04/2024 1443   3b. FEMALE Any Age, or MALE 65+: How often do you have 4 or more drinks on one occassion? 0 Filed at: 07/04/2024 1443   Audit-C Score 0 Filed at: 07/04/2024 1443   SAMIA: How many times in the past year have you...    Used an illegal drug or used a prescription medication for non-medical reasons? Never Filed at: 07/04/2024 1443                      Medical Decision Making  Medical decision making 43-year-old female presents emergency department with redness and tenderness behind her right knee, I discussed with patient, consistent with an abscess, minimal fluctuance but advised incision and drainage.  Wound was opened, there was some minimal amount of pus expressed, area was probed.  Incision and probing were subcutaneous.  Discussed with patient left the wound open.  Discussed treatment with antibiotics discussed follow-up discussed indications to return.    Risk  Prescription drug management.             Disposition  Final diagnoses:   Abscess - Right posterior knee     Time reflects when diagnosis was documented in both MDM as applicable and the Disposition within this note       Time User Action Codes Description Comment    7/4/2024  3:07 PM Gage Wilson Add [L02.91] Abscess     7/4/2024  3:07 PM Gage Wilson Modify [L02.91] Abscess Right posterior knee          ED Disposition       ED Disposition   Discharge    Condition   Stable    Date/Time   Thu Jul 4, 2024 1507    Comment   Celeste Coffman discharge to home/self care.                   Follow-up Information    None         Discharge Medication List as of 7/4/2024  3:08 PM        CONTINUE these medications which have CHANGED    Details   cephalexin (KEFLEX) 500 mg capsule Take 1 capsule (500 mg total) by mouth every 6 (six) hours for 7 days,  Starting Thu 7/4/2024, Until Thu 7/11/2024, Print           CONTINUE these medications which have NOT CHANGED    Details   famotidine (PEPCID) 20 mg tablet Take 1 tablet (20 mg total) by mouth 2 (two) times a day for 5 days, Starting Thu 6/13/2024, Until Tue 6/18/2024, Normal      lidocaine (Lidoderm) 5 % Apply 1 patch topically over 12 hours daily for 5 days Remove & Discard patch within 12 hours or as directed by MD, Starting Thu 6/13/2024, Until Tue 6/18/2024, Normal      pantoprazole (PROTONIX) 20 mg tablet Take 1 tablet (20 mg total) by mouth daily for 5 days, Starting Thu 6/13/2024, Until Tue 6/18/2024, Normal      TiZANidine (ZANAFLEX) 2 MG capsule Take 1 capsule (2 mg total) by mouth 3 (three) times a day for 3 days, Starting Thu 6/13/2024, Until Sun 6/16/2024, Normal             No discharge procedures on file.    PDMP Review       None            ED Provider  Electronically Signed by             Gage Wilson MD  07/04/24 1912       Gage Wilson MD  07/04/24 1913

## 2024-07-07 ENCOUNTER — HOSPITAL ENCOUNTER (OUTPATIENT)
Facility: HOSPITAL | Age: 44
Setting detail: OBSERVATION
Discharge: HOME | End: 2024-07-10
Attending: EMERGENCY MEDICINE | Admitting: FAMILY MEDICINE
Payer: COMMERCIAL

## 2024-07-07 DIAGNOSIS — D50.8 IRON DEFICIENCY ANEMIA SECONDARY TO INADEQUATE DIETARY IRON INTAKE: ICD-10-CM

## 2024-07-07 DIAGNOSIS — Z78.9 FAILURE OF OUTPATIENT TREATMENT: Primary | ICD-10-CM

## 2024-07-07 DIAGNOSIS — M79.604 RIGHT LEG PAIN: ICD-10-CM

## 2024-07-07 DIAGNOSIS — D50.0 IRON DEFICIENCY ANEMIA DUE TO CHRONIC BLOOD LOSS: ICD-10-CM

## 2024-07-07 DIAGNOSIS — L02.91 ABSCESS: ICD-10-CM

## 2024-07-07 DIAGNOSIS — L03.116 CELLULITIS OF LEFT LOWER EXTREMITY: ICD-10-CM

## 2024-07-07 PROCEDURE — 99284 EMERGENCY DEPT VISIT MOD MDM: CPT

## 2024-07-07 PROCEDURE — 99285 EMERGENCY DEPT VISIT HI MDM: CPT

## 2024-07-07 NOTE — LETTER
[facility] 3RD FLOOR MED SURG UNIT  [address] [facility]  [place] [place] [address]  Dept: [phone]    July 10, 2024     Patient: Celeste Coffman   YOB: 1980   Date of Visit: 7/7/2024       To Whom it May Concern:    Celeste Coffman is under my professional care. She was seen in the hospital from 7/7/2024 to 07/10/24. She may return to work on 7/15/2024 without limitations. Advised rest till 7/14/2024.    If you have any questions or concerns, please don't hesitate to call.         Sincerely,          Jake Lopez MD

## 2024-07-08 ENCOUNTER — APPOINTMENT (OUTPATIENT)
Dept: CT IMAGING | Facility: HOSPITAL | Age: 44
End: 2024-07-08
Payer: COMMERCIAL

## 2024-07-08 ENCOUNTER — APPOINTMENT (EMERGENCY)
Dept: RADIOLOGY | Facility: HOSPITAL | Age: 44
End: 2024-07-08
Payer: COMMERCIAL

## 2024-07-08 PROBLEM — L03.116 CELLULITIS OF LEFT LOWER EXTREMITY: Status: ACTIVE | Noted: 2024-07-08

## 2024-07-08 PROBLEM — D50.8 IRON DEFICIENCY ANEMIA SECONDARY TO INADEQUATE DIETARY IRON INTAKE: Status: ACTIVE | Noted: 2024-07-08

## 2024-07-08 PROBLEM — L03.116 CELLULITIS OF LEFT LOWER EXTREMITY: Status: RESOLVED | Noted: 2024-07-08 | Resolved: 2024-07-08

## 2024-07-08 PROBLEM — L03.115 CELLULITIS OF RIGHT LOWER EXTREMITY: Status: ACTIVE | Noted: 2024-07-08

## 2024-07-08 LAB
ANION GAP SERPL CALCULATED.3IONS-SCNC: 7 MMOL/L (ref 4–13)
ANION GAP SERPL CALCULATED.3IONS-SCNC: 8 MMOL/L (ref 4–13)
BASOPHILS # BLD AUTO: 0.02 THOUSANDS/ÂΜL (ref 0–0.1)
BASOPHILS NFR BLD AUTO: 0 % (ref 0–1)
BUN SERPL-MCNC: 13 MG/DL (ref 5–25)
BUN SERPL-MCNC: 16 MG/DL (ref 5–25)
CALCIUM SERPL-MCNC: 8.5 MG/DL (ref 8.4–10.2)
CALCIUM SERPL-MCNC: 9.1 MG/DL (ref 8.4–10.2)
CHLORIDE SERPL-SCNC: 103 MMOL/L (ref 96–108)
CHLORIDE SERPL-SCNC: 104 MMOL/L (ref 96–108)
CO2 SERPL-SCNC: 25 MMOL/L (ref 21–32)
CO2 SERPL-SCNC: 26 MMOL/L (ref 21–32)
CREAT SERPL-MCNC: 0.69 MG/DL (ref 0.6–1.3)
CREAT SERPL-MCNC: 0.83 MG/DL (ref 0.6–1.3)
EOSINOPHIL # BLD AUTO: 0.21 THOUSAND/ÂΜL (ref 0–0.61)
EOSINOPHIL NFR BLD AUTO: 3 % (ref 0–6)
ERYTHROCYTE [DISTWIDTH] IN BLOOD BY AUTOMATED COUNT: 19.9 % (ref 11.6–15.1)
ERYTHROCYTE [DISTWIDTH] IN BLOOD BY AUTOMATED COUNT: 19.9 % (ref 11.6–15.1)
FERRITIN SERPL-MCNC: 6 NG/ML (ref 11–307)
FOLATE SERPL-MCNC: 5.7 NG/ML
GFR SERPL CREATININE-BSD FRML MDRD: 106 ML/MIN/1.73SQ M
GFR SERPL CREATININE-BSD FRML MDRD: 86 ML/MIN/1.73SQ M
GLUCOSE P FAST SERPL-MCNC: 84 MG/DL (ref 65–99)
GLUCOSE SERPL-MCNC: 84 MG/DL (ref 65–140)
GLUCOSE SERPL-MCNC: 88 MG/DL (ref 65–140)
HCG SERPL QL: NEGATIVE
HCT VFR BLD AUTO: 24.2 % (ref 34.8–46.1)
HCT VFR BLD AUTO: 24.9 % (ref 34.8–46.1)
HGB BLD-MCNC: 7 G/DL (ref 11.5–15.4)
HGB BLD-MCNC: 7 G/DL (ref 11.5–15.4)
IMM GRANULOCYTES # BLD AUTO: 0.02 THOUSAND/UL (ref 0–0.2)
IMM GRANULOCYTES NFR BLD AUTO: 0 % (ref 0–2)
IRON SATN MFR SERPL: 4 % (ref 15–50)
IRON SERPL-MCNC: 15 UG/DL (ref 50–212)
LACTATE SERPL-SCNC: 0.8 MMOL/L (ref 0.5–2)
LYMPHOCYTES # BLD AUTO: 1.34 THOUSANDS/ÂΜL (ref 0.6–4.47)
LYMPHOCYTES NFR BLD AUTO: 17 % (ref 14–44)
MCH RBC QN AUTO: 17.7 PG (ref 26.8–34.3)
MCH RBC QN AUTO: 18.1 PG (ref 26.8–34.3)
MCHC RBC AUTO-ENTMCNC: 28.1 G/DL (ref 31.4–37.4)
MCHC RBC AUTO-ENTMCNC: 28.9 G/DL (ref 31.4–37.4)
MCV RBC AUTO: 63 FL (ref 82–98)
MCV RBC AUTO: 63 FL (ref 82–98)
MONOCYTES # BLD AUTO: 0.73 THOUSAND/ÂΜL (ref 0.17–1.22)
MONOCYTES NFR BLD AUTO: 9 % (ref 4–12)
NEUTROPHILS # BLD AUTO: 5.56 THOUSANDS/ÂΜL (ref 1.85–7.62)
NEUTS SEG NFR BLD AUTO: 71 % (ref 43–75)
NRBC BLD AUTO-RTO: 0 /100 WBCS
PLATELET # BLD AUTO: 359 THOUSANDS/UL (ref 149–390)
PLATELET # BLD AUTO: 368 THOUSANDS/UL (ref 149–390)
PMV BLD AUTO: 9.7 FL (ref 8.9–12.7)
PMV BLD AUTO: 9.9 FL (ref 8.9–12.7)
POTASSIUM SERPL-SCNC: 3.4 MMOL/L (ref 3.5–5.3)
POTASSIUM SERPL-SCNC: 3.6 MMOL/L (ref 3.5–5.3)
RBC # BLD AUTO: 3.87 MILLION/UL (ref 3.81–5.12)
RBC # BLD AUTO: 3.95 MILLION/UL (ref 3.81–5.12)
RETICS # AUTO: NORMAL 10*3/UL (ref 14097–95744)
RETICS # CALC: 0.94 % (ref 0.37–1.87)
SODIUM SERPL-SCNC: 136 MMOL/L (ref 135–147)
SODIUM SERPL-SCNC: 137 MMOL/L (ref 135–147)
TIBC SERPL-MCNC: 427 UG/DL (ref 250–450)
UIBC SERPL-MCNC: 412 UG/DL (ref 155–355)
VIT B12 SERPL-MCNC: 274 PG/ML (ref 180–914)
WBC # BLD AUTO: 6 THOUSAND/UL (ref 4.31–10.16)
WBC # BLD AUTO: 7.88 THOUSAND/UL (ref 4.31–10.16)

## 2024-07-08 PROCEDURE — 82746 ASSAY OF FOLIC ACID SERUM: CPT | Performed by: STUDENT IN AN ORGANIZED HEALTH CARE EDUCATION/TRAINING PROGRAM

## 2024-07-08 PROCEDURE — 83605 ASSAY OF LACTIC ACID: CPT

## 2024-07-08 PROCEDURE — 84703 CHORIONIC GONADOTROPIN ASSAY: CPT

## 2024-07-08 PROCEDURE — 80048 BASIC METABOLIC PNL TOTAL CA: CPT | Performed by: FAMILY MEDICINE

## 2024-07-08 PROCEDURE — 82607 VITAMIN B-12: CPT | Performed by: STUDENT IN AN ORGANIZED HEALTH CARE EDUCATION/TRAINING PROGRAM

## 2024-07-08 PROCEDURE — 96365 THER/PROPH/DIAG IV INF INIT: CPT

## 2024-07-08 PROCEDURE — 85027 COMPLETE CBC AUTOMATED: CPT | Performed by: FAMILY MEDICINE

## 2024-07-08 PROCEDURE — 85025 COMPLETE CBC W/AUTO DIFF WBC: CPT

## 2024-07-08 PROCEDURE — 99223 1ST HOSP IP/OBS HIGH 75: CPT | Performed by: FAMILY MEDICINE

## 2024-07-08 PROCEDURE — 96375 TX/PRO/DX INJ NEW DRUG ADDON: CPT

## 2024-07-08 PROCEDURE — 83540 ASSAY OF IRON: CPT | Performed by: FAMILY MEDICINE

## 2024-07-08 PROCEDURE — 73700 CT LOWER EXTREMITY W/O DYE: CPT

## 2024-07-08 PROCEDURE — 85045 AUTOMATED RETICULOCYTE COUNT: CPT | Performed by: STUDENT IN AN ORGANIZED HEALTH CARE EDUCATION/TRAINING PROGRAM

## 2024-07-08 PROCEDURE — 83550 IRON BINDING TEST: CPT | Performed by: FAMILY MEDICINE

## 2024-07-08 PROCEDURE — 80048 BASIC METABOLIC PNL TOTAL CA: CPT

## 2024-07-08 PROCEDURE — 82728 ASSAY OF FERRITIN: CPT | Performed by: FAMILY MEDICINE

## 2024-07-08 PROCEDURE — 36415 COLL VENOUS BLD VENIPUNCTURE: CPT

## 2024-07-08 PROCEDURE — 73564 X-RAY EXAM KNEE 4 OR MORE: CPT

## 2024-07-08 RX ORDER — VANCOMYCIN HYDROCHLORIDE 750 MG/150ML
750 INJECTION, SOLUTION INTRAVENOUS EVERY 8 HOURS
Status: DISCONTINUED | OUTPATIENT
Start: 2024-07-08 | End: 2024-07-09

## 2024-07-08 RX ORDER — ENOXAPARIN SODIUM 100 MG/ML
40 INJECTION SUBCUTANEOUS DAILY
Status: DISCONTINUED | OUTPATIENT
Start: 2024-07-08 | End: 2024-07-10 | Stop reason: HOSPADM

## 2024-07-08 RX ORDER — SODIUM CHLORIDE 9 MG/ML
75 INJECTION, SOLUTION INTRAVENOUS CONTINUOUS
Status: DISPENSED | OUTPATIENT
Start: 2024-07-08 | End: 2024-07-09

## 2024-07-08 RX ORDER — ACETAMINOPHEN 10 MG/ML
1000 INJECTION, SOLUTION INTRAVENOUS ONCE
Status: COMPLETED | OUTPATIENT
Start: 2024-07-08 | End: 2024-07-08

## 2024-07-08 RX ORDER — FOLIC ACID 1 MG/1
1 TABLET ORAL DAILY
Status: DISCONTINUED | OUTPATIENT
Start: 2024-07-09 | End: 2024-07-10 | Stop reason: HOSPADM

## 2024-07-08 RX ORDER — ACETAMINOPHEN 325 MG/1
650 TABLET ORAL EVERY 6 HOURS PRN
Status: DISCONTINUED | OUTPATIENT
Start: 2024-07-08 | End: 2024-07-10 | Stop reason: HOSPADM

## 2024-07-08 RX ORDER — OXYCODONE HYDROCHLORIDE 5 MG/1
5 TABLET ORAL EVERY 4 HOURS PRN
Status: DISCONTINUED | OUTPATIENT
Start: 2024-07-08 | End: 2024-07-10 | Stop reason: HOSPADM

## 2024-07-08 RX ORDER — ACETAMINOPHEN 10 MG/ML
1000 INJECTION, SOLUTION INTRAVENOUS ONCE
Status: COMPLETED | OUTPATIENT
Start: 2024-07-08 | End: 2024-07-09

## 2024-07-08 RX ORDER — MORPHINE SULFATE 4 MG/ML
4 INJECTION, SOLUTION INTRAMUSCULAR; INTRAVENOUS EVERY 4 HOURS PRN
Status: DISCONTINUED | OUTPATIENT
Start: 2024-07-08 | End: 2024-07-10 | Stop reason: HOSPADM

## 2024-07-08 RX ORDER — PANTOPRAZOLE SODIUM 40 MG/1
40 TABLET, DELAYED RELEASE ORAL
Status: DISCONTINUED | OUTPATIENT
Start: 2024-07-08 | End: 2024-07-10 | Stop reason: HOSPADM

## 2024-07-08 RX ADMIN — PANTOPRAZOLE SODIUM 40 MG: 40 TABLET, DELAYED RELEASE ORAL at 12:51

## 2024-07-08 RX ADMIN — CEFEPIME 2000 MG: 2 INJECTION, POWDER, FOR SOLUTION INTRAVENOUS at 09:39

## 2024-07-08 RX ADMIN — ACETAMINOPHEN 650 MG: 325 TABLET, FILM COATED ORAL at 20:10

## 2024-07-08 RX ADMIN — SODIUM CHLORIDE 250 ML: 0.9 INJECTION, SOLUTION INTRAVENOUS at 22:40

## 2024-07-08 RX ADMIN — CEFEPIME 1000 MG: 2 INJECTION, POWDER, FOR SOLUTION INTRAVENOUS at 00:55

## 2024-07-08 RX ADMIN — ENOXAPARIN SODIUM 40 MG: 40 INJECTION SUBCUTANEOUS at 09:37

## 2024-07-08 RX ADMIN — OXYCODONE HYDROCHLORIDE 5 MG: 5 TABLET ORAL at 20:52

## 2024-07-08 RX ADMIN — ACETAMINOPHEN 1000 MG: 10 INJECTION INTRAVENOUS at 22:40

## 2024-07-08 RX ADMIN — VANCOMYCIN HYDROCHLORIDE 750 MG: 750 INJECTION, SOLUTION INTRAVENOUS at 12:51

## 2024-07-08 RX ADMIN — VANCOMYCIN HYDROCHLORIDE 750 MG: 750 INJECTION, SOLUTION INTRAVENOUS at 20:20

## 2024-07-08 RX ADMIN — ACETAMINOPHEN 1000 MG: 10 INJECTION INTRAVENOUS at 00:28

## 2024-07-08 RX ADMIN — VANCOMYCIN HYDROCHLORIDE 1750 MG: 1 INJECTION, POWDER, LYOPHILIZED, FOR SOLUTION INTRAVENOUS at 01:31

## 2024-07-08 RX ADMIN — SODIUM CHLORIDE 75 ML/HR: 0.9 INJECTION, SOLUTION INTRAVENOUS at 20:27

## 2024-07-08 RX ADMIN — SODIUM CHLORIDE 1000 ML: 0.9 INJECTION, SOLUTION INTRAVENOUS at 00:28

## 2024-07-08 RX ADMIN — IRON SUCROSE 200 MG: 20 INJECTION, SOLUTION INTRAVENOUS at 14:36

## 2024-07-08 NOTE — ED PROVIDER NOTES
History  Chief Complaint   Patient presents with    Leg Pain     Pt arrives ambulatory with family c/o R leg pain after a possible bite behind the knee. Pt was here Thursday for same, prescribed Keflex, and states she is not getting better.      Patient is a 43-year-old female who presents to the emergency room for worsening abscess.  Patient was seen on 07/04 for abscess behind her right knee, abscess I&D in the ED.  Patient was started on Keflex, patient has been taking as prescribed.  Today, patient reports fevers of 100.4F, worsening pain, spreading erythema.  Reports pain with ambulation.  Denies any other symptoms.  No medication for symptoms.      Leg Pain  Associated symptoms: fever    Associated symptoms: no back pain        Prior to Admission Medications   Prescriptions Last Dose Informant Patient Reported? Taking?   TiZANidine (ZANAFLEX) 2 MG capsule   No No   Sig: Take 1 capsule (2 mg total) by mouth 3 (three) times a day for 3 days   cephalexin (KEFLEX) 500 mg capsule   No No   Sig: Take 1 capsule (500 mg total) by mouth every 6 (six) hours for 7 days   famotidine (PEPCID) 20 mg tablet   No No   Sig: Take 1 tablet (20 mg total) by mouth 2 (two) times a day for 5 days   lidocaine (Lidoderm) 5 %   No No   Sig: Apply 1 patch topically over 12 hours daily for 5 days Remove & Discard patch within 12 hours or as directed by MD   pantoprazole (PROTONIX) 20 mg tablet   No No   Sig: Take 1 tablet (20 mg total) by mouth daily for 5 days      Facility-Administered Medications: None       No past medical history on file.    No past surgical history on file.    No family history on file.  I have reviewed and agree with the history as documented.    E-Cigarette/Vaping    E-Cigarette Use Never User      E-Cigarette/Vaping Substances     Social History     Tobacco Use    Smoking status: Never    Smokeless tobacco: Never   Vaping Use    Vaping status: Never Used   Substance Use Topics    Alcohol use: Not Currently     Drug use: Never       Review of Systems   Constitutional:  Positive for fever. Negative for chills.   HENT:  Negative for ear pain, sore throat, trouble swallowing and voice change.    Eyes:  Negative for pain and visual disturbance.   Respiratory:  Negative for cough and shortness of breath.    Cardiovascular:  Negative for chest pain and palpitations.   Gastrointestinal:  Negative for abdominal pain, nausea and vomiting.   Genitourinary:  Negative for dysuria and hematuria.   Musculoskeletal:  Positive for myalgias. Negative for arthralgias and back pain.   Skin:  Positive for color change and wound. Negative for rash.   Neurological:  Negative for seizures and syncope.   Psychiatric/Behavioral:  Negative for confusion.    All other systems reviewed and are negative.      Physical Exam  Physical Exam  Vitals and nursing note reviewed.   Constitutional:       General: She is not in acute distress.     Appearance: Normal appearance. She is well-developed.   HENT:      Head: Normocephalic and atraumatic.   Eyes:      Conjunctiva/sclera: Conjunctivae normal.   Cardiovascular:      Rate and Rhythm: Normal rate and regular rhythm.      Pulses: Normal pulses.      Heart sounds: No murmur heard.  Pulmonary:      Effort: Pulmonary effort is normal. No respiratory distress.      Breath sounds: Normal breath sounds.   Abdominal:      Palpations: Abdomen is soft.      Tenderness: There is no abdominal tenderness.   Musculoskeletal:         General: No swelling.      Cervical back: Neck supple.        Legs:    Skin:     General: Skin is warm and dry.      Capillary Refill: Capillary refill takes less than 2 seconds.   Neurological:      Mental Status: She is alert.   Psychiatric:         Mood and Affect: Mood normal.         Vital Signs  ED Triage Vitals [07/07/24 2337]   Temperature Pulse Respirations Blood Pressure SpO2   98.5 °F (36.9 °C) 86 18 121/56 100 %      Temp Source Heart Rate Source Patient Position - Orthostatic  VS BP Location FiO2 (%)   Oral Monitor Lying Right arm --      Pain Score       --           Vitals:    07/07/24 2337   BP: 121/56   Pulse: 86   Patient Position - Orthostatic VS: Lying         Visual Acuity  Visual Acuity      Flowsheet Row Most Recent Value   L Pupil Size (mm) 3   R Pupil Size (mm) 3            ED Medications  Medications   sodium chloride 0.9 % bolus 1,000 mL (1,000 mL Intravenous New Bag 7/8/24 0028)   cefepime (MAXIPIME) 1 g/50 mL dextrose IVPB (1,000 mg Intravenous New Bag 7/8/24 0055)   vancomycin (VANCOCIN) 1,750 mg in sodium chloride 0.9 % 500 mL IVPB (has no administration in time range)   acetaminophen (Ofirmev) injection 1,000 mg (0 mg Intravenous Stopped 7/8/24 0054)       Diagnostic Studies  Results Reviewed       Procedure Component Value Units Date/Time    hCG, qualitative pregnancy [[id number]]  (Normal) Collected: 07/08/24 0029    Lab Status: Final result Specimen: Blood from Arm, Right Updated: 07/08/24 0105     Preg, Serum Negative    Basic metabolic panel [[id number]] Collected: 07/08/24 0029    Lab Status: Final result Specimen: Blood from Arm, Right Updated: 07/08/24 0100     Sodium 136 mmol/L      Potassium 3.6 mmol/L      Chloride 103 mmol/L      CO2 25 mmol/L      ANION GAP 8 mmol/L      BUN 16 mg/dL      Creatinine 0.83 mg/dL      Glucose 88 mg/dL      Calcium 9.1 mg/dL      eGFR 86 ml/min/1.73sq m     Narrative:      National Kidney Disease Foundation guidelines for Chronic Kidney Disease (CKD):     Stage 1 with normal or high GFR (GFR > 90 mL/min/1.73 square meters)    Stage 2 Mild CKD (GFR = 60-89 mL/min/1.73 square meters)    Stage 3A Moderate CKD (GFR = 45-59 mL/min/1.73 square meters)    Stage 3B Moderate CKD (GFR = 30-44 mL/min/1.73 square meters)    Stage 4 Severe CKD (GFR = 15-29 mL/min/1.73 square meters)    Stage 5 End Stage CKD (GFR <15 mL/min/1.73 square meters)  Note: GFR calculation is accurate only with a steady state creatinine    Lactic acid, plasma  (w/reflex if result > 2.0) [[id number]]  (Normal) Collected: 07/08/24 0029    Lab Status: Final result Specimen: Blood from Arm, Right Updated: 07/08/24 0059     LACTIC ACID 0.8 mmol/L     Narrative:      Result may be elevated if tourniquet was used during collection.    CBC and differential [[id number]]  (Abnormal) Collected: 07/08/24 0029    Lab Status: Final result Specimen: Blood from Arm, Right Updated: 07/08/24 0039     WBC 7.88 Thousand/uL      RBC 3.87 Million/uL      Hemoglobin 7.0 g/dL      Hematocrit 24.2 %      MCV 63 fL      MCH 18.1 pg      MCHC 28.9 g/dL      RDW 19.9 %      MPV 9.9 fL      Platelets 368 Thousands/uL      nRBC 0 /100 WBCs      Segmented % 71 %      Immature Grans % 0 %      Lymphocytes % 17 %      Monocytes % 9 %      Eosinophils Relative 3 %      Basophils Relative 0 %      Absolute Neutrophils 5.56 Thousands/µL      Absolute Immature Grans 0.02 Thousand/uL      Absolute Lymphocytes 1.34 Thousands/µL      Absolute Monocytes 0.73 Thousand/µL      Eosinophils Absolute 0.21 Thousand/µL      Basophils Absolute 0.02 Thousands/µL                    XR knee 4+ views Right injury   ED Interpretation by Kaylee Sanches PA-C (07/08 0049)   No acute osseous abnormality                 Procedures  Procedures         ED Course  ED Course as of 07/08/24 0111   Mon Jul 08, 2024 0048 Hemoglobin(!): 7.0                               SBIRT 22yo+      Flowsheet Row Most Recent Value   Initial Alcohol Screen: US AUDIT-C     1. How often do you have a drink containing alcohol? 0 Filed at: 07/07/2024 2339   2. How many drinks containing alcohol do you have on a typical day you are drinking?  0 Filed at: 07/07/2024 2339   3b. FEMALE Any Age, or MALE 65+: How often do you have 4 or more drinks on one occassion? 0 Filed at: 07/07/2024 2339   Audit-C Score 0 Filed at: 07/07/2024 2339   SAMIA: How many times in the past year have you...    Used an illegal drug or used a prescription medication for non-medical  reasons? Never Filed at: 07/07/2024 7054                      Medical Decision Making  43-year-old female with recent abscess I&D behind her right knee on 07/04. Started on Keflex and has been taking as prescribed. Reporting fevers, worsening pain and spreading erythema. Difficulty ambulating due to painful gait. Does not meet SIRS criteria at this time. However, did start IV cefepime and vancomycin in the ED. Discussed with internal medicine for admission due to failure of outpatient antibiotics.  Patient to be admitted to their service.  Discussed ED course and admission with patient.  Patient understands and consents to admission.    Amount and/or Complexity of Data Reviewed  Labs: ordered. Decision-making details documented in ED Course.  Radiology: ordered and independent interpretation performed.    Risk  Prescription drug management.  Decision regarding hospitalization.             Disposition  Final diagnoses:   Failure of outpatient treatment   Abscess   Right leg pain     Time reflects when diagnosis was documented in both MDM as applicable and the Disposition within this note       Time User Action Codes Description Comment    7/8/2024 12:53 AM Kaylee Sanches [Z78.9] Failure of outpatient treatment     7/8/2024 12:54 AM Kaylee Sanches [L02.91] Abscess     7/8/2024 12:54 AM Kaylee Sanches [M79.604] Right leg pain           ED Disposition       ED Disposition   Admit    Condition   Stable    Date/Time   Mon Jul 8, 2024 0054    Comment   Case was discussed with Dr. Leslie and the patient's admission status was agreed to be Admission Status: observation status to the service of Dr. Leslie .               Follow-up Information    None         Patient's Medications   Discharge Prescriptions    No medications on file       No discharge procedures on file.    PDMP Review       None            ED Provider  Electronically Signed by             Kaylee Sanches PA-C  07/08/24 0111

## 2024-07-08 NOTE — ASSESSMENT & PLAN NOTE
Status post I&D on July 4, 2024 by the ER.  There are no wound cultures.  Patient was sent home on Keflex, now with worsening erythema  Will start on cefepime and vancomycin.  Will obtain CT scan of the right knee, see if there is any further fluid collection

## 2024-07-08 NOTE — H&P
[facility]  H&P  Name: Celeste Coffman 43 y.o. female I MRN: [id number]  Unit/Bed#: BECKY I Date of Admission: 7/7/2024   Date of Service: 7/8/2024 I Hospital Day: 0      Assessment & Plan   * Cellulitis of right lower extremity  Assessment & Plan  Status post I&D on July 4, 2024 by the ER.  There are no wound cultures.  Patient was sent home on Keflex, now with worsening erythema  Will start on cefepime and vancomycin.  Will obtain CT scan of the right knee, see if there is any further fluid collection    Iron deficiency anemia secondary to inadequate dietary iron intake  Assessment & Plan  Current hemoglobin is 7 with MCV of 63.  Most likely iron deficiency anemia.  Patient has always been chronically anemic.  Will obtain iron panel           Disposition  #1  IV cefepime and vancomycin  #2  Iron panel pending  #3  CBC BMP in a.m.  #4 CT of the right knee          VTE Prophylaxis: Enoxaparin (Lovenox)  / sequential compression device   Code Status: Level 1 - Full Code       Anticipated Length of Stay:  Patient will be admitted on an Observation basis with an anticipated length of stay of less than 2 midnights.   Justification for Hospital Stay: Please see detailed plans noted above.    Chief Complaint:     Right leg pain  History of Present Illness:  Celeste Coffman is a 43 y.o. female with no past medical history, comes in for worsening pain of the right knee.  The patient was seen in the ER on July 4, 2024 and was found to have an abscess status post I&D in the ED.  No cultures were sent.  She was sent home with Keflex.  She had worsening pain and fever of 100.4 today.  Erythema spreading behind the knee.  Reports pain with ambulation      Review of Systems:    Constitutional: Fever  Eyes:  Denies change in visual acuity   HENT:  Denies nasal congestion or sore throat   Respiratory:  Denies cough or shortness of breath   Cardiovascular:  Denies chest pain or edema   GI:   Denies abdominal pain or bloody stools  :  Denies dysuria   Musculoskeletal: Pain with ambulation  Integument: Worsening erythema of the leg  Neurologic:  Denies headache or sensory changes   Endocrine:  Denies polyuria or polydipsia   Lymphatic:  Denies swollen glands   Psychiatric:  Denies depression or anxiety     Past Medical and Surgical History:   History reviewed. No pertinent past medical history.  History reviewed. No pertinent surgical history.    Meds/Allergies:  Keflex      Allergies: No Known Allergies    History:  Marital Status: /Civil Union     Substance Use History:   Social History     Substance and Sexual Activity   Alcohol Use Not Currently     Social History     Tobacco Use   Smoking Status Never   Smokeless Tobacco Never     Social History     Substance and Sexual Activity   Drug Use Never       Family History:  History reviewed. No pertinent family history.    Physical Exam:     Vitals:   Blood Pressure: 121/56 (07/07/24 2337)  Pulse: 86 (07/07/24 2337)  Temperature: 98.5 °F (36.9 °C) (07/07/24 2337)  Temp Source: Oral (07/07/24 2337)  Respirations: 18 (07/07/24 2337)  Weight - Scale: 87.1 kg (192 lb) (07/08/24 0028)  SpO2: 100 % (07/07/24 2337)    Constitutional:  Non-toxic appearance  Eyes:  EOMI, No scleral icterus   HENT:   oropharynx moist, external ears normal, external nose normal   Respiratory:  No respiratory distress, no wheezing   Cardiovascular:  Normal rate, no murmurs   GI:  Soft, nondistended, no guarding   :  No costovertebral angle tenderness   Musculoskeletal: Erythema of the right knee and behind the knee and tenderness upon palpation  Integument:  no jaundice, no rash   Neurologic:  Alert &awake, communicative, CN 2-12 normal,  no focal deficits noted   Psychiatric:  Speech and behavior appropriate       Lab Results: I have personally reviewed pertinent reports.   and I have personally reviewed pertinent films in PACS    Results from last 7 days   Lab Units  07/08/24  0029   WBC Thousand/uL 7.88   HEMOGLOBIN g/dL 7.0*   HEMATOCRIT % 24.2*   PLATELETS Thousands/uL 368   SEGS PCT % 71   LYMPHO PCT % 17   MONO PCT % 9   EOS PCT % 3     Results from last 7 days   Lab Units 07/08/24  0029   POTASSIUM mmol/L 3.6   CHLORIDE mmol/L 103   CO2 mmol/L 25   BUN mg/dL 16   CREATININE mg/dL 0.83   CALCIUM mg/dL 9.1           Imaging: I have personally reviewed pertinent reports.   and I have personally reviewed pertinent films in PACS      X-ray of the right knee  I reviewed the films and interpreted the films myself, no abnormality seen.  Official radiology read pending    Medical decision making: High  Diagnosis addressed: 1 acute complicated medical problem with the right knee cellulitis status post I&D on July 4 and failing outpatient antibiotic therapy, anemia  Data:   Reviewed  CBC, CMP, lactic acid, pregnancy test  Ordered CBC, BMP, iron panel  Reviewed external notes from GYN and PCP  Independent interpretation of imaging: I reviewed the x-ray of the right knee, no osseous abnormality noted, official radiology pending  Discussion of management with ER provider: Discussed with ER provider, started on cefepime and vancomycin       Risk:  Prescription drug management: IV cefepime and vancomycin  Discussion for hospitalization with ER provider: Requires hospitalization due to failure of outpatient antibiotic therapy and I&D.  Drug therapy requiring intensive monitoring: IV vancomycin, requires Vanco trough, and daily renal panel to avoid nephrotoxicity.  Pharmacy consult placed  Initiation of parenteral controlled substances: IV morphine                   Epic Records Reviewed as well as Records in Care Everywhere    ** Please Note: Dragon 360 Dictation voice to text software was used in the creation of this document. **

## 2024-07-08 NOTE — QUICK NOTE
SLIM update:     Please see H&P from this morning for full details.  In brief summary, 43-year-old female presents with worsening cellulitis despite oral antibiotics and I&D in ED earlier this week.  Feeling little bit better today with slightly better range of motion, still swollen.    A/P:  Cellulitis of right lower extremity:  -CT (noncontrast) without any signs of abscess or deep tissue infection  -Continue IV antibiotics, consolidated to vancomycin monotherapy  -May need adjustment of oral antibiotics on discharge    2.  Chronic anemia:  -Does not appear this was investigated previously.  Microcytosis  -Ferritin markedly low, iron panel suggestive of severe iron deficiency anemia.  Started on Venofer here.  -B12 normal, folate also low.  Unclear etiology of folic acid deficiency, may need further outpatient workup  -Etiology for iron deficiency anemia seems to be menorrhagia.  She does follow with OB/GYN outpatient and can follow-up with them for further care of this.  She does also endorse some intermittent heartburn for which she was seen in the ED last month.  Will start oral PPI here and she can follow-up with GI outpatient for further management

## 2024-07-08 NOTE — PROGRESS NOTES
Celeste Coffman is a 43 y.o. female who is currently ordered Vancomycin IV with management by the Pharmacy Consult service.  Relevant clinical data and objective / subjective history reviewed.  Vancomycin Assessment:  Indication and Goal AUC/Trough: Soft tissue (goal -600, trough >10)  Clinical Status:  New start  Micro:   pending  Renal Function:  SCr: 0.83 mg/dL  CrCl: 88.1 mL/min  Renal replacement: Not on dialysis  Days of Therapy: 1  Current Dose:    Vancomycin Plan:  New Dosing:    Estimated AUC: 473 mcg*hr/mL  Estimated Trough: 14.9 mcg/mL  Next Level: 07/9/24  Renal Function Monitoring: Daily BMP and UOP  Pharmacy will continue to follow closely for s/sx of nephrotoxicity, infusion reactions and appropriateness of therapy.  BMP and CBC will be ordered per protocol. We will continue to follow the patient’s culture results and clinical progress daily.    Yovani Pereira, Pharmacist

## 2024-07-08 NOTE — ASSESSMENT & PLAN NOTE
Current hemoglobin is 7 with MCV of 63.  Most likely iron deficiency anemia.  Patient has always been chronically anemic.  Will obtain iron panel

## 2024-07-08 NOTE — PLAN OF CARE
Problem: PAIN - ADULT  Goal: Verbalizes/displays adequate comfort level or baseline comfort level  Description: Interventions:  - Encourage patient to monitor pain and request assistance  - Assess pain using appropriate pain scale  - Administer analgesics based on type and severity of pain and evaluate response  - Implement non-pharmacological measures as appropriate and evaluate response  - Consider cultural and social influences on pain and pain management  - Notify physician/advanced practitioner if interventions unsuccessful or patient reports new pain  7/8/2024 0144 by Chapis Land RN  Outcome: Progressing  7/8/2024 0144 by Chapis Land RN  Outcome: Progressing     Problem: INFECTION - ADULT  Goal: Absence or prevention of progression during hospitalization  Description: INTERVENTIONS:  - Assess and monitor for signs and symptoms of infection  - Monitor lab/diagnostic results  - Monitor all insertion sites, i.e. indwelling lines, tubes, and drains  - Monitor endotracheal if appropriate and nasal secretions for changes in amount and color  - [place] appropriate cooling/warming therapies per order  - Administer medications as ordered  - Instruct and encourage patient and family to use good hand hygiene technique  - Identify and instruct in appropriate isolation precautions for identified infection/condition  7/8/2024 0144 by Chapis Land RN  Outcome: Progressing  7/8/2024 0144 by Chapis Land RN  Outcome: Progressing  Goal: Absence of fever/infection during neutropenic period  Description: INTERVENTIONS:  - Monitor WBC    7/8/2024 0144 by Chapis Land RN  Outcome: Progressing  7/8/2024 0144 by Chapis Land RN  Outcome: Progressing     Problem: SKIN/TISSUE INTEGRITY - ADULT  Goal: Incision(s), wounds(s) or drain site(s) healing without S/S of infection  Description: INTERVENTIONS  - Assess and document dressing, incision, wound bed, drain sites and surrounding tissue  - Provide patient and family  education  Outcome: Progressing

## 2024-07-09 LAB
ABO GROUP BLD: NORMAL
ABO GROUP BLD: NORMAL
ALBUMIN SERPL BCG-MCNC: 3.6 G/DL (ref 3.5–5)
ALP SERPL-CCNC: 79 U/L (ref 34–104)
ALT SERPL W P-5'-P-CCNC: 19 U/L (ref 7–52)
ANION GAP SERPL CALCULATED.3IONS-SCNC: 7 MMOL/L (ref 4–13)
ANISOCYTOSIS BLD QL SMEAR: PRESENT
AST SERPL W P-5'-P-CCNC: 16 U/L (ref 13–39)
BILIRUB DIRECT SERPL-MCNC: 0.09 MG/DL (ref 0–0.2)
BILIRUB SERPL-MCNC: 0.3 MG/DL (ref 0.2–1)
BLD GP AB SCN SERPL QL: NEGATIVE
BUN SERPL-MCNC: 7 MG/DL (ref 5–25)
CALCIUM SERPL-MCNC: 8.3 MG/DL (ref 8.4–10.2)
CHLORIDE SERPL-SCNC: 104 MMOL/L (ref 96–108)
CO2 SERPL-SCNC: 27 MMOL/L (ref 21–32)
CREAT SERPL-MCNC: 0.68 MG/DL (ref 0.6–1.3)
ERYTHROCYTE [DISTWIDTH] IN BLOOD BY AUTOMATED COUNT: 19.8 % (ref 11.6–15.1)
GFR SERPL CREATININE-BSD FRML MDRD: 107 ML/MIN/1.73SQ M
GLUCOSE SERPL-MCNC: 110 MG/DL (ref 65–140)
HCT VFR BLD AUTO: 23.2 % (ref 34.8–46.1)
HEMOCCULT STL QL: NEGATIVE
HGB BLD-MCNC: 6.7 G/DL (ref 11.5–15.4)
HYPERCHROMIA BLD QL SMEAR: PRESENT
LDH SERPL-CCNC: 179 U/L (ref 140–271)
LYMPHOCYTES # BLD AUTO: 0.65 THOUSAND/UL (ref 0.6–4.47)
LYMPHOCYTES # BLD AUTO: 6 %
MCH RBC QN AUTO: 18 PG (ref 26.8–34.3)
MCHC RBC AUTO-ENTMCNC: 28.9 G/DL (ref 31.4–37.4)
MCV RBC AUTO: 62 FL (ref 82–98)
MONOCYTES # BLD AUTO: 0.11 THOUSAND/UL (ref 0–1.22)
MONOCYTES NFR BLD AUTO: 1 % (ref 4–12)
NEUTS BAND NFR BLD MANUAL: 3 % (ref 0–8)
NEUTS SEG # BLD: 10.08 THOUSAND/UL (ref 1.81–6.82)
NEUTS SEG NFR BLD AUTO: 90 %
OVALOCYTES BLD QL SMEAR: PRESENT
PLATELET # BLD AUTO: 358 THOUSANDS/UL (ref 149–390)
PLATELET BLD QL SMEAR: ABNORMAL
PMV BLD AUTO: 9.7 FL (ref 8.9–12.7)
POLYCHROMASIA BLD QL SMEAR: PRESENT
POTASSIUM SERPL-SCNC: 3.3 MMOL/L (ref 3.5–5.3)
PROT SERPL-MCNC: 6.8 G/DL (ref 6.4–8.4)
RBC # BLD AUTO: 3.72 MILLION/UL (ref 3.81–5.12)
RBC MORPH BLD: PRESENT
RH BLD: POSITIVE
RH BLD: POSITIVE
SCHISTOCYTES BLD QL SMEAR: PRESENT
SODIUM SERPL-SCNC: 138 MMOL/L (ref 135–147)
SPECIMEN EXPIRATION DATE: NORMAL
TARGETS BLD QL SMEAR: PRESENT
TOTAL CELLS COUNTED SPEC: 100
VANCOMYCIN SERPL-MCNC: 15.2 UG/ML (ref 10–20)
WBC # BLD AUTO: 10.84 THOUSAND/UL (ref 4.31–10.16)

## 2024-07-09 PROCEDURE — 99223 1ST HOSP IP/OBS HIGH 75: CPT | Performed by: PHYSICIAN ASSISTANT

## 2024-07-09 PROCEDURE — P9040 RBC LEUKOREDUCED IRRADIATED: HCPCS

## 2024-07-09 PROCEDURE — 80048 BASIC METABOLIC PNL TOTAL CA: CPT | Performed by: STUDENT IN AN ORGANIZED HEALTH CARE EDUCATION/TRAINING PROGRAM

## 2024-07-09 PROCEDURE — 86850 RBC ANTIBODY SCREEN: CPT | Performed by: PHYSICIAN ASSISTANT

## 2024-07-09 PROCEDURE — 80076 HEPATIC FUNCTION PANEL: CPT | Performed by: PHYSICIAN ASSISTANT

## 2024-07-09 PROCEDURE — 83010 ASSAY OF HAPTOGLOBIN QUANT: CPT | Performed by: PHYSICIAN ASSISTANT

## 2024-07-09 PROCEDURE — 86901 BLOOD TYPING SEROLOGIC RH(D): CPT | Performed by: PHYSICIAN ASSISTANT

## 2024-07-09 PROCEDURE — 85007 BL SMEAR W/DIFF WBC COUNT: CPT | Performed by: PHYSICIAN ASSISTANT

## 2024-07-09 PROCEDURE — 86923 COMPATIBILITY TEST ELECTRIC: CPT

## 2024-07-09 PROCEDURE — 85027 COMPLETE CBC AUTOMATED: CPT | Performed by: STUDENT IN AN ORGANIZED HEALTH CARE EDUCATION/TRAINING PROGRAM

## 2024-07-09 PROCEDURE — 86900 BLOOD TYPING SEROLOGIC ABO: CPT | Performed by: PHYSICIAN ASSISTANT

## 2024-07-09 PROCEDURE — 80202 ASSAY OF VANCOMYCIN: CPT | Performed by: FAMILY MEDICINE

## 2024-07-09 PROCEDURE — 83615 LACTATE (LD) (LDH) ENZYME: CPT | Performed by: PHYSICIAN ASSISTANT

## 2024-07-09 PROCEDURE — 99232 SBSQ HOSP IP/OBS MODERATE 35: CPT | Performed by: INTERNAL MEDICINE

## 2024-07-09 PROCEDURE — 82272 OCCULT BLD FECES 1-3 TESTS: CPT | Performed by: INTERNAL MEDICINE

## 2024-07-09 RX ORDER — CYANOCOBALAMIN 1000 UG/ML
1000 INJECTION, SOLUTION INTRAMUSCULAR; SUBCUTANEOUS ONCE
Status: COMPLETED | OUTPATIENT
Start: 2024-07-09 | End: 2024-07-09

## 2024-07-09 RX ORDER — POTASSIUM CHLORIDE 20 MEQ/1
40 TABLET, EXTENDED RELEASE ORAL ONCE
Status: COMPLETED | OUTPATIENT
Start: 2024-07-09 | End: 2024-07-09

## 2024-07-09 RX ORDER — POLYETHYLENE GLYCOL 3350 17 G/17G
238 POWDER, FOR SOLUTION ORAL ONCE
Status: COMPLETED | OUTPATIENT
Start: 2024-07-09 | End: 2024-07-09

## 2024-07-09 RX ORDER — SODIUM CHLORIDE 9 MG/ML
75 INJECTION, SOLUTION INTRAVENOUS CONTINUOUS
Status: DISPENSED | OUTPATIENT
Start: 2024-07-09 | End: 2024-07-09

## 2024-07-09 RX ADMIN — CEFEPIME 1000 MG: 2 INJECTION, POWDER, FOR SOLUTION INTRAVENOUS at 00:11

## 2024-07-09 RX ADMIN — FOLIC ACID 1 MG: 1 TABLET ORAL at 09:04

## 2024-07-09 RX ADMIN — POLYETHYLENE GLYCOL 3350 238 G: 17 POWDER, FOR SOLUTION ORAL at 12:31

## 2024-07-09 RX ADMIN — POTASSIUM CHLORIDE 40 MEQ: 1500 TABLET, EXTENDED RELEASE ORAL at 10:41

## 2024-07-09 RX ADMIN — IRON SUCROSE 200 MG: 20 INJECTION, SOLUTION INTRAVENOUS at 12:54

## 2024-07-09 RX ADMIN — CYANOCOBALAMIN 1000 MCG: 1000 INJECTION, SOLUTION INTRAMUSCULAR; SUBCUTANEOUS at 17:12

## 2024-07-09 RX ADMIN — SODIUM CHLORIDE 75 ML/HR: 0.9 INJECTION, SOLUTION INTRAVENOUS at 12:58

## 2024-07-09 RX ADMIN — VANCOMYCIN HYDROCHLORIDE 1250 MG: 5 INJECTION, POWDER, LYOPHILIZED, FOR SOLUTION INTRAVENOUS at 15:47

## 2024-07-09 RX ADMIN — PANTOPRAZOLE SODIUM 40 MG: 40 TABLET, DELAYED RELEASE ORAL at 05:01

## 2024-07-09 RX ADMIN — ENOXAPARIN SODIUM 40 MG: 40 INJECTION SUBCUTANEOUS at 09:09

## 2024-07-09 RX ADMIN — VANCOMYCIN HYDROCHLORIDE 750 MG: 750 INJECTION, SOLUTION INTRAVENOUS at 04:59

## 2024-07-09 NOTE — CASE MANAGEMENT
Case Management Progress Note    Patient name Celeste Martinez [facility]  Location /-01 MRN [id number]  : 1980 Date 2024       LOS (days): 0  Geometric Mean LOS (GMLOS) (days):   Days to GMLOS:        OBJECTIVE:        Current admission status: Observation  Preferred Pharmacy:   [id number] - [place] - [address]  [facility] [place] [address]  Phone: [phone] Fax: [phone]    Primary Care Provider: Anais Hays    Primary Insurance: BLUE CROSS  Secondary Insurance:     PROGRESS NOTE:  Discussed patient's case in interdisciplinary rounds this morning with SLIM provider. Patient is anticipated to be admitted for approximately 48 hours for IV antibiotic therapy. No anticipated home care needs. CM will continue to follow for needs.

## 2024-07-09 NOTE — ASSESSMENT & PLAN NOTE
Current hemoglobin is 6.7 with MCV of 62 on 7/9/24  Most likely iron deficiency anemia.  Patient has always been chronically anemic and has very heavy menses, also noted by her gynecologist. Last menses ended on Saturday 7/7.   Normal reticulocyte count and percent; iron panel with low iron, ferritin, folate, and normal TIBC.   GI consulted, will do EGD/colonoscopy, prepping for 7/10  Hematology being consulted  Trend Hb and HCT, transfuse pRBC as needed  Venofer infusions as ordered

## 2024-07-09 NOTE — CONSULTS
Consultation -  [facility]  Celeste Martinez [facility] 43 y.o. female MRN: [id number]  Unit/Bed#: -01 Encounter: [id number]        Inpatient consult to gastroenterology  Consult performed by: Erin Gonzales PA-C  Consult ordered by: Jake Lopez MD          Reason for Consult / Principal Problem: Iron deficiency anemia    HPI: 43-year-old female with a long history of anemia but no other significant medical problems who presented to [facility] on Sunday evening with complaints of right knee pain.  She had a known cellulitis for which she was being treated with Keflex.  Unfortunately, she was found to be failing outpatient antibiotic treatment and admitted for further antibiotic coverage.  Since admission the patient has been noted to be significantly anemic with a hemoglobin of 7.  This dropped slightly to 6.7 this morning.  She denies any significant GI symptoms at present.  She denies abdominal pain, nausea, vomiting, diarrhea or constipation.  She has no obvious rectal bleeding, melena or hematemesis.  She did have a recent episode of severe heartburn for which she came to the emergency room.  She reports that it was only that 1 day and has not recurred since.  She reports that at that time she was having some pain with swallowing.  She does not take NSAIDs.  She is on no blood thinning medications.  She has never had an EGD or colonoscopy.  She admits to menorrhagia for which she follows with gynecology.  It has been suggested to her that this is the source of her anemia    REVIEW OF SYSTEMS:    CONSTITUTIONAL: Denies any fever, chills, or rigors. Good appetite, and no recent weight loss.  HEENT: No earache or tinnitus. Denies hearing loss or visual disturbances.  CARDIOVASCULAR: No chest pain or palpitations.   RESPIRATORY: Denies any cough, hemoptysis, shortness of breath or dyspnea on exertion.  GASTROINTESTINAL: As noted in the History of Present Illness.    GENITOURINARY: No problems with urination. Denies any hematuria or dysuria.  NEUROLOGIC: No dizziness or vertigo, denies headaches.   MUSCULOSKELETAL: Denies any muscle or joint pain.   SKIN: Denies skin rashes or itching.   ENDOCRINE: Denies excessive thirst. Denies intolerance to heat or cold.  PSYCHOSOCIAL: Denies depression or anxiety. Denies any recent memory loss.       Historical Information   History reviewed. No pertinent past medical history.  History reviewed. No pertinent surgical history.  Social History   Social History     Substance and Sexual Activity   Alcohol Use Not Currently     Social History     Substance and Sexual Activity   Drug Use Never     Social History     Tobacco Use   Smoking Status Never   Smokeless Tobacco Never     History reviewed. No pertinent family history.    Meds/Allergies       Medications Prior to Admission:     cephalexin (KEFLEX) 500 mg capsule    famotidine (PEPCID) 20 mg tablet    lidocaine (Lidoderm) 5 %    pantoprazole (PROTONIX) 20 mg tablet    TiZANidine (ZANAFLEX) 2 MG capsule  Current Facility-Administered Medications   Medication Dose Route Frequency    acetaminophen (TYLENOL) tablet 650 mg  650 mg Oral Q6H PRN    cefepime (MAXIPIME) 1,000 mg in dextrose 5 % 50 mL IVPB  1,000 mg Intravenous Q12H    enoxaparin (LOVENOX) subcutaneous injection 40 mg  40 mg Subcutaneous Daily    folic acid (FOLVITE) tablet 1 mg  1 mg Oral Daily    iron sucrose (VENOFER) 200 mg in sodium chloride 0.9 % 100 mL IVPB  200 mg Intravenous Daily    morphine injection 4 mg  4 mg Intravenous Q4H PRN    oxyCODONE (ROXICODONE) IR tablet 5 mg  5 mg Oral Q4H PRN    pantoprazole (PROTONIX) EC tablet 40 mg  40 mg Oral Early Morning    sodium chloride 0.9 % infusion  75 mL/hr Intravenous Continuous    vancomycin (VANCOCIN) 1,250 mg in sodium chloride 0.9 % 250 mL IVPB  1,250 mg Intravenous Q12H       No Known Allergies        Objective     Blood pressure 100/62, pulse 70, temperature 97.8 °F  "(36.6 °C), resp. rate 20, height 5' 8\" (1.727 m), weight 88.2 kg (194 lb 8 oz), last menstrual period 07/03/2024, SpO2 99%.      Intake/Output Summary (Last 24 hours) at 7/9/2024 1101  Last data filed at 7/9/2024 0900  Gross per 24 hour   Intake 480 ml   Output 1600 ml   Net -1120 ml         PHYSICAL EXAM:      General Appearance:   Alert, cooperative, no distress, appears stated age    HEENT:   Normocephalic, atraumatic, anicteric.     Neck:  Supple, symmetrical, trachea midline, no adenopathy;    thyroid: no enlargement/tenderness/nodules; no carotid  bruit or JVD    Lungs:   Clear to auscultation bilaterally; no rales, rhonchi or wheezing; respirations unlabored    Heart::   S1 and S2 normal; regular rate and rhythm; no murmur, rub, or gallop.   Abdomen:   Soft, non-tender, non-distended; normal bowel sounds; no masses, no organomegaly    Genitalia:   Deferred    Rectal:   Deferred    Extremities:  No cyanosis, clubbing or edema    Pulses:  2+ and symmetric all extremities    Skin:  Skin color, texture, turgor normal, no rashes or lesions    Lymph nodes:  No palpable cervical, axillary or inguinal lymphadenopathy        Lab Results:   Results from last 7 days   Lab Units 07/09/24  0537 07/08/24  0531 07/08/24  0029   WBC Thousand/uL 10.84*   < > 7.88   HEMOGLOBIN g/dL 6.7*   < > 7.0*   HEMATOCRIT % 23.2*   < > 24.2*   PLATELETS Thousands/uL 358   < > 368   SEGS PCT %  --   --  71   LYMPHO PCT %  --   --  17   MONO PCT %  --   --  9   EOS PCT %  --   --  3    < > = values in this interval not displayed.     Results from last 7 days   Lab Units 07/09/24  0537   POTASSIUM mmol/L 3.3*   CHLORIDE mmol/L 104   CO2 mmol/L 27   BUN mg/dL 7   CREATININE mg/dL 0.68   CALCIUM mg/dL 8.3*               Imaging Studies: I have personally reviewed pertinent imaging studies.    CT lower extremity wo contrast right    Result Date: 7/8/2024  Impression: No evidence of an abscess; however, please note that evaluation is limited " without intravenous contrast material. Nonspecific posterior subcutaneous edema. Workstation performed: [id number]     XR knee 4+ views Right injury    Result Date: 7/8/2024  Impression: No acute osseous abnormality. Computerized Assisted Algorithm (CAA) may have been used to analyze all applicable images. Workstation performed: [id number]       ASSESSMENT and PLAN:      Iron deficiency anemia  - Patient with chronic anemia with baseline hemoglobin recently in the 7s  - Since admission Hgb dropped to 6.7 today  - Iron panel notes % saturation of 4 and total iron 15  - At least in part related to menorrhagia for which she follows with gyn  - She has never had a GI evaluation for this  - She notes recent heartburn but no other GI symptoms  - Agreed to proceed with EGD/Colonoscopy. She wishes to do this inpatient - will prep for tomorrow  - Continue to trend Hgb/Hct and transfuse PRBC as needed  - Venofer infusions as ordered        Patient was seen and examined by Dr Yarbrough. All ventura medical decisions were made by Dr. Yarbrough. Thank you for allowing us to participate in the care of this present patient. We will follow-up with you closely.

## 2024-07-09 NOTE — CONSULTS
Medical Oncology/Hematology Consult Note  Celeste Coffman, female, 43 y.o., 1980,  /-[birth year], [id number]     Reason for admission: RLE cellulitis   Reason for consultation: Iron deficiency anemia       ASSESSMENT AND PLAN: 43yr old female presented to the hospital with RLE cellulitis and was incidentally found to have microcytic anemia with hgb 7g/dL and MCV 63. Labs consistent with iron, b12, and folate deficiency. SACHIN likely from menorrhagia.   Check hemolysis labs   Transfuse 1U prbc for hgb <7. Blood consent obtained at bedside.   Continue IV Venofer 200mg daily x 3   Cyanocobalamin injection x 1 then 1000 mcg daily   Begin folic acid 1 mg daily   Agree with GI-work up   Outpatient hematology follow up for continued IV iron therapy. If anemia does not improve following adequate iron replacement we will pursue further work-up and possible bone marrow biopsy.      Patient understands and is in agreement with this plan. Thank you for the opportunity to participate in this patient's care.      ECO    History of present illness: 43yr old female with no significant past medical history who presented to emergency room 2024 with RLE pain. Pain originally started on  which was the day after she returned from [facility]. She thought maybe you bit by something during travel. Was seen in ED on  and was started on keflex for RLE cellulitis and abscess. She returned  with increasing pain, edema, and erythema. Had fatigue, shortness of breath, lightheadedness, and dizziness yesterday at time of presentation. Labs revealed -> WBC 7.88, hemoglobin 7.0, MCV 63, platelets 368,000.  Iron 15, iron saturation 4%, TIBC 427, UIBC 412, ferritin 6.  Folate 5.7.  Vitamin B12 274.  Reticulocyte 0.9%.  There is limited data available in EMR for comparison.  Last CBC 2024 showed hemoglobin 7.1 with MCV of 63.  Remainder of CBC was unremarkable.  Prior to this her last labs were from   "where she has hemoglobin of 9 with associated microcytosis.  On further chart review she had labs from 2019 that showed hemoglobin of 11 with normal MCV.    Has monthly menstrual cycle. Bleeds for 5 days. The first 3 days are heaviest. She wears 2 super pads every 2 hours. No other bleeding or melena. Denies abdominal pain or early satiety. About 1 month ago she was seen in ED for back/chest pain. Work-up was negative and symptoms were attributed to GERD. She reports resolution in symptoms after 5 days of protonix. Did not continue therapy after 5 day Rx was completed. No history of bariatric surgery or known malabsorptive conditions. Not on any oral supplements. Has never been seen by hematology or required blood transfusion. Her sister has anemia from heavy vaginal bleeding. No know heart problems. No alcohol, drug or tobacco use. She does not consume much red meat, mostly consumes chicken, fish. She consumes a lot of vegetables.       Review of Systems:   Review of Systems   Constitutional:  Positive for fever. Negative for unexpected weight change.        No night sweats   Respiratory:  Positive for shortness of breath.    Cardiovascular:  Negative for chest pain.   Gastrointestinal:  Negative for blood in stool.        No blacks stools     Genitourinary:  Negative for hematuria.   Neurological:  Positive for dizziness and light-headedness.         PHYSICAL EXAM:    /62   Pulse 70   Temp 97.8 °F (36.6 °C)   Resp 20   Ht 5' 8\" (1.727 m)   Wt 88.2 kg (194 lb 8 oz)   LMP 07/03/2024 (Exact Date)   SpO2 99%   BMI 29.57 kg/m²     Physical Exam  Vitals reviewed.   HENT:      Head: Normocephalic.   Cardiovascular:      Rate and Rhythm: Normal rate and regular rhythm.   Pulmonary:      Effort: Pulmonary effort is normal.      Breath sounds: Normal breath sounds.   Abdominal:      Palpations: Abdomen is soft.      Tenderness: There is no abdominal tenderness.   Musculoskeletal:      Cervical back: Neck " supple.   Lymphadenopathy:      Cervical: No cervical adenopathy.   Skin:     Findings: No rash.   Neurological:      Mental Status: She is alert.         LABS:     Recent Results (from the past 48 hour(s))   CBC and differential    Collection Time: 07/08/24 12:29 AM   Result Value Ref Range    WBC 7.88 4.31 - 10.16 Thousand/uL    RBC 3.87 3.81 - 5.12 Million/uL    Hemoglobin 7.0 (L) 11.5 - 15.4 g/dL    Hematocrit 24.2 (L) 34.8 - 46.1 %    MCV 63 (L) 82 - 98 fL    MCH 18.1 (L) 26.8 - 34.3 pg    MCHC 28.9 (L) 31.4 - 37.4 g/dL    RDW 19.9 (H) 11.6 - 15.1 %    MPV 9.9 8.9 - 12.7 fL    Platelets 368 149 - 390 Thousands/uL    nRBC 0 /100 WBCs    Segmented % 71 43 - 75 %    Immature Grans % 0 0 - 2 %    Lymphocytes % 17 14 - 44 %    Monocytes % 9 4 - 12 %    Eosinophils Relative 3 0 - 6 %    Basophils Relative 0 0 - 1 %    Absolute Neutrophils 5.56 1.85 - 7.62 Thousands/µL    Absolute Immature Grans 0.02 0.00 - 0.20 Thousand/uL    Absolute Lymphocytes 1.34 0.60 - 4.47 Thousands/µL    Absolute Monocytes 0.73 0.17 - 1.22 Thousand/µL    Eosinophils Absolute 0.21 0.00 - 0.61 Thousand/µL    Basophils Absolute 0.02 0.00 - 0.10 Thousands/µL   Basic metabolic panel    Collection Time: 07/08/24 12:29 AM   Result Value Ref Range    Sodium 136 135 - 147 mmol/L    Potassium 3.6 3.5 - 5.3 mmol/L    Chloride 103 96 - 108 mmol/L    CO2 25 21 - 32 mmol/L    ANION GAP 8 4 - 13 mmol/L    BUN 16 5 - 25 mg/dL    Creatinine 0.83 0.60 - 1.30 mg/dL    Glucose 88 65 - 140 mg/dL    Calcium 9.1 8.4 - 10.2 mg/dL    eGFR 86 ml/min/1.73sq m   hCG, qualitative pregnancy    Collection Time: 07/08/24 12:29 AM   Result Value Ref Range    Preg, Serum Negative Negative   Lactic acid, plasma (w/reflex if result > 2.0)    Collection Time: 07/08/24 12:29 AM   Result Value Ref Range    LACTIC ACID 0.8 0.5 - 2.0 mmol/L   TIBC Panel (incl. Iron, TIBC, % Iron Saturation)    Collection Time: 07/08/24 12:29 AM   Result Value Ref Range    Iron Saturation 4 (L) 15 -  50 %    TIBC 427 250 - 450 ug/dL    Iron 15 (L) 50 - 212 ug/dL    UIBC 412 (H) 155 - 355 ug/dL   Ferritin    Collection Time: 07/08/24 12:29 AM   Result Value Ref Range    Ferritin 6 (L) 11 - 307 ng/mL   Basic metabolic panel    Collection Time: 07/08/24  5:31 AM   Result Value Ref Range    Sodium 137 135 - 147 mmol/L    Potassium 3.4 (L) 3.5 - 5.3 mmol/L    Chloride 104 96 - 108 mmol/L    CO2 26 21 - 32 mmol/L    ANION GAP 7 4 - 13 mmol/L    BUN 13 5 - 25 mg/dL    Creatinine 0.69 0.60 - 1.30 mg/dL    Glucose 84 65 - 140 mg/dL    Glucose, Fasting 84 65 - 99 mg/dL    Calcium 8.5 8.4 - 10.2 mg/dL    eGFR 106 ml/min/1.73sq m   CBC (With Platelets)    Collection Time: 07/08/24  5:31 AM   Result Value Ref Range    WBC 6.00 4.31 - 10.16 Thousand/uL    RBC 3.95 3.81 - 5.12 Million/uL    Hemoglobin 7.0 (L) 11.5 - 15.4 g/dL    Hematocrit 24.9 (L) 34.8 - 46.1 %    MCV 63 (L) 82 - 98 fL    MCH 17.7 (L) 26.8 - 34.3 pg    MCHC 28.1 (L) 31.4 - 37.4 g/dL    RDW 19.9 (H) 11.6 - 15.1 %    Platelets 359 149 - 390 Thousands/uL    MPV 9.7 8.9 - 12.7 fL   Vitamin B12    Collection Time: 07/08/24  5:31 AM   Result Value Ref Range    Vitamin B-12 274 180 - 914 pg/mL   Folate    Collection Time: 07/08/24  5:31 AM   Result Value Ref Range    Folate 5.7 (L) >5.9 ng/mL   Retic Count    Collection Time: 07/08/24  5:31 AM   Result Value Ref Range    Retic Ct Abs 36,000 14,097 - 95,744    Retic Ct Pct 0.94 0.37 - 1.87 %   Vancomycin, random    Collection Time: 07/09/24  5:37 AM   Result Value Ref Range    Vancomycin Rm 15.2 10.0 - 20.0 ug/mL   Basic metabolic panel    Collection Time: 07/09/24  5:37 AM   Result Value Ref Range    Sodium 138 135 - 147 mmol/L    Potassium 3.3 (L) 3.5 - 5.3 mmol/L    Chloride 104 96 - 108 mmol/L    CO2 27 21 - 32 mmol/L    ANION GAP 7 4 - 13 mmol/L    BUN 7 5 - 25 mg/dL    Creatinine 0.68 0.60 - 1.30 mg/dL    Glucose 110 65 - 140 mg/dL    Calcium 8.3 (L) 8.4 - 10.2 mg/dL    eGFR 107 ml/min/1.73sq m   CBC     Collection Time: 07/09/24  5:37 AM   Result Value Ref Range    WBC 10.84 (H) 4.31 - 10.16 Thousand/uL    RBC 3.72 (L) 3.81 - 5.12 Million/uL    Hemoglobin 6.7 (L) 11.5 - 15.4 g/dL    Hematocrit 23.2 (L) 34.8 - 46.1 %    MCV 62 (L) 82 - 98 fL    MCH 18.0 (L) 26.8 - 34.3 pg    MCHC 28.9 (L) 31.4 - 37.4 g/dL    RDW 19.8 (H) 11.6 - 15.1 %    Platelets 358 149 - 390 Thousands/uL    MPV 9.7 8.9 - 12.7 fL       CT lower extremity wo contrast right    Result Date: 7/8/2024  Narrative: CT RIGHT KNEE WITHOUT IV CONTRAST INDICATION: s/p I&D of right behind the knee abscess, returns with worsening redness and pain... r/o further abscess or infection behind the knee. COMPARISON: Right knee radiograph 7/8/2024 TECHNIQUE: CT examination of the above was performed. This examination, like all CT scans performed in the [facility], was performed utilizing techniques to minimize radiation dose exposure, including the use of iterative reconstruction and automated exposure control software.  Multiplanar 2D reformatted images were created from the source data. Rad dose  326 mGy-cm FINDINGS: OSSEOUS STRUCTURES:  No fracture, dislocation or destructive osseous lesion. Mild tricompartmental osteoarthritis, evidenced by partial-thickness articular cartilage loss and small marginal osteophytes. VISUALIZED MUSCULATURE:  Unremarkable. SOFT TISSUES: Posterior subcutaneous edema. No evidence of a focal fluid collection. OTHER PERTINENT FINDINGS:  None.     Impression: No evidence of an abscess; however, please note that evaluation is limited without intravenous contrast material. Nonspecific posterior subcutaneous edema. Workstation performed: [id number]     XR knee 4+ views Right injury    Result Date: 7/8/2024  Narrative: XR KNEE 4+ VW RIGHT INJURY INDICATION: pain. COMPARISON: None FINDINGS: No acute fracture or dislocation. No joint effusion. Mild degenerative changes. No lytic or blastic osseous lesion. Unremarkable soft  tissues.     Impression: No acute osseous abnormality. Computerized Assisted Algorithm (CAA) may have been used to analyze all applicable images. Workstation performed: [id number]     XR chest 2 views    Result Date: 6/13/2024  Narrative: XR CHEST PA & LATERAL INDICATION: Cardiac work-up. COMPARISON: None FINDINGS: Clear lungs. No pneumothorax or pleural effusion. Normal cardiomediastinal silhouette. Bones are unremarkable for age. Normal upper abdomen.     Impression: No acute cardiopulmonary disease. Workstation performed: [id number]         HISTORY:    History reviewed. No pertinent past medical history.    History reviewed. No pertinent surgical history.    History reviewed. No pertinent family history.    Social History     Socioeconomic History    Marital status: /Civil Union     Spouse name: None    Number of children: None    Years of education: None    Highest education level: None   Occupational History    None   Tobacco Use    Smoking status: Never    Smokeless tobacco: Never   Vaping Use    Vaping status: Never Used   Substance and Sexual Activity    Alcohol use: Not Currently    Drug use: Never    Sexual activity: None   Other Topics Concern    None   Social History Narrative    None     Social Determinants of Health     Financial Resource Strain: Not on file   Food Insecurity: No Food Insecurity (7/8/2024)    Hunger Vital Sign     Worried About Running Out of Food in the Last Year: Never true     Ran Out of Food in the Last Year: Never true   Transportation Needs: No Transportation Needs (7/8/2024)    PRAPARE - Transportation     Lack of Transportation (Medical): No     Lack of Transportation (Non-Medical): No   Physical Activity: Not on file   Stress: Not on file   Social Connections: Not on file   Intimate Partner Violence: Not on file   Housing Stability: Low Risk  (7/8/2024)    Housing Stability Vital Sign     Unable to Pay for Housing in the Last Year: No     Number of Times Moved in the  Last Year: 1     Homeless in the Last Year: No         Current Facility-Administered Medications:     acetaminophen (TYLENOL) tablet 650 mg, 650 mg, Oral, Q6H PRN, Omid Leslie MD, 650 mg at 07/08/24 2010    cefepime (MAXIPIME) 1,000 mg in dextrose 5 % 50 mL IVPB, 1,000 mg, Intravenous, Q12H, Sol Colin MD, Last Rate: 100 mL/hr at 07/09/24 0011, 1,000 mg at 07/09/24 0011    enoxaparin (LOVENOX) subcutaneous injection 40 mg, 40 mg, Subcutaneous, Daily, Omdi Leslie MD, 40 mg at 07/09/24 0909    folic acid (FOLVITE) tablet 1 mg, 1 mg, Oral, Daily, Antione Cordoba MD, 1 mg at 07/09/24 0904    iron sucrose (VENOFER) 200 mg in sodium chloride 0.9 % 100 mL IVPB, 200 mg, Intravenous, Daily, Antione Cordoba MD, Last Rate: 100 mL/hr at 07/09/24 1254, 200 mg at 07/09/24 1254    morphine injection 4 mg, 4 mg, Intravenous, Q4H PRN, Omid Leslie MD    oxyCODONE (ROXICODONE) IR tablet 5 mg, 5 mg, Oral, Q4H PRN, Omid Leslie MD, 5 mg at 07/08/24 2052    pantoprazole (PROTONIX) EC tablet 40 mg, 40 mg, Oral, Early Morning, Antione Cordoba MD, 40 mg at 07/09/24 0501    sodium chloride 0.9 % infusion, 75 mL/hr, Intravenous, Continuous, Jake Lopez MD, Last Rate: 75 mL/hr at 07/09/24 1258, 75 mL/hr at 07/09/24 1258    vancomycin (VANCOCIN) 1,250 mg in sodium chloride 0.9 % 250 mL IVPB, 1,250 mg, Intravenous, Q12H, Omid Leslie MD      Medications Prior to Admission:     cephalexin (KEFLEX) 500 mg capsule    famotidine (PEPCID) 20 mg tablet    lidocaine (Lidoderm) 5 %    pantoprazole (PROTONIX) 20 mg tablet    TiZANidine (ZANAFLEX) 2 MG capsule    No Known Allergies    Labs and pertinent reports reviewed.      This note has been generated by voice recognition software system.  Therefore, there may be spelling, grammar, and or syntax errors. Please contact if questions arise.

## 2024-07-09 NOTE — ASSESSMENT & PLAN NOTE
Status post I&D on July 4, 2024 by the ER.  There are no wound cultures.  Patient was sent home on Keflex, now with worsening erythema  CT on 7/8 showed no abscess   Trend WBCs, 10.84 on 7/9 (6.00 on 7/8)   Continuing with vancomycin and cefepime

## 2024-07-09 NOTE — NURSING NOTE
Patient complains of chills, headache, back pain and Fever 102.9. Will give tylenol and continue to monitor    Doctor notified   Orders placed

## 2024-07-09 NOTE — PLAN OF CARE
Problem: PAIN - ADULT  Goal: Verbalizes/displays adequate comfort level or baseline comfort level  Description: Interventions:  - Encourage patient to monitor pain and request assistance  - Assess pain using appropriate pain scale  - Administer analgesics based on type and severity of pain and evaluate response  - Implement non-pharmacological measures as appropriate and evaluate response  - Consider cultural and social influences on pain and pain management  - Notify physician/advanced practitioner if interventions unsuccessful or patient reports new pain  Outcome: Progressing     Problem: INFECTION - ADULT  Goal: Absence or prevention of progression during hospitalization  Description: INTERVENTIONS:  - Assess and monitor for signs and symptoms of infection  - Monitor lab/diagnostic results  - Monitor all insertion sites, i.e. indwelling lines, tubes, and drains  - Monitor endotracheal if appropriate and nasal secretions for changes in amount and color  - Marshall appropriate cooling/warming therapies per order  - Administer medications as ordered  - Instruct and encourage patient and family to use good hand hygiene technique  - Identify and instruct in appropriate isolation precautions for identified infection/condition  Outcome: Progressing  Goal: Absence of fever/infection during neutropenic period  Description: INTERVENTIONS:  - Monitor WBC    Outcome: Progressing     Problem: SKIN/TISSUE INTEGRITY - ADULT  Goal: Incision(s), wounds(s) or drain site(s) healing without S/S of infection  Description: INTERVENTIONS  - Assess and document dressing, incision, wound bed, drain sites and surrounding tissue  - Provide patient and family education  Outcome: Progressing      no redness

## 2024-07-09 NOTE — PROGRESS NOTES
Celeste Coffman is a 43 y.o. female who is currently ordered Vancomycin IV with management by the Pharmacy Consult service.  Relevant clinical data and objective / subjective history reviewed.  Vancomycin Assessment:  Indication and Goal AUC/Trough: Soft tissue (goal -600, trough >10), -600, trough >10  Clinical Status: stable  Micro:     Renal Function:  SCr: 0.68 mg/dL  CrCl: 122.8 mL/min  Renal replacement: Not on dialysis  Days of Therapy: 2  Current Dose: 750mg IV q8h  Vancomycin Plan:  New Dosinmg IV q12h  Estimated AUC: 417 mcg*hr/mL  Estimated Trough: 10.3 mcg/mL  Next Level: 24 AM level   Renal Function Monitoring: Daily BMP and UOP  Pharmacy will continue to follow closely for s/sx of nephrotoxicity, infusion reactions and appropriateness of therapy.  BMP and CBC will be ordered per protocol. We will continue to follow the patient’s culture results and clinical progress daily.    Shonna Brennan, Pharmacist

## 2024-07-09 NOTE — TREATMENT PLAN
D/W hematology. Hold transfusion unless she becomes hemodynamically unstable or Hb less than 6    Hematology subsequently transfused 1 unit of PRBC on 7/9/24

## 2024-07-09 NOTE — PROGRESS NOTES
[facility]  Progress Note  Name: Celeste Coffman I  MRN: [id number]  Unit/Bed#: MS [place] I Date of Admission: 7/7/2024   Date of Service: 7/9/2024 I Hospital Day: 0    Assessment & Plan   Iron deficiency anemia secondary to inadequate dietary iron intake  Assessment & Plan  Current hemoglobin is 6.7 with MCV of 62 on 7/9/24  Most likely iron deficiency anemia.  Patient has always been chronically anemic and has very heavy menses, also noted by her gynecologist. Last menses ended on Saturday 7/7.   Normal reticulocyte count and percent; iron panel with low iron, ferritin, folate, and normal TIBC.   GI consulted, will do EGD/colonoscopy, prepping for 7/10  Hematology being consulted  Trend Hb and HCT, transfuse pRBC as needed  Venofer infusions as ordered     * Cellulitis of right lower extremity  Assessment & Plan  Status post I&D on July 4, 2024 by the ER.  There are no wound cultures.  Patient was sent home on Keflex, now with worsening erythema  CT on 7/8 showed no abscess   Trend WBCs, 10.84 on 7/9 (6.00 on 7/8)   Continuing with vancomycin and cefepime            Patient seems to have chronic anemia.  For GI and oncology consulted.  Patient prepared for endoscopy and colonoscopy on 7/10/2024.  Will continue iron supplementation and also give B12 and folate    VTE Pharmacologic Prophylaxis: VTE Score: 3 Moderate Risk (Score 3-4) - Pharmacological DVT Prophylaxis Ordered: enoxaparin (Lovenox).    Mobility:   Basic Mobility Inpatient Raw Score: 24  JH-HLM Goal: 8: Walk 250 feet or more  JH-HLM Achieved: 7: Walk 25 feet or more  JH-HLM Goal NOT achieved. Continue with multidisciplinary rounding and encourage appropriate mobility to improve upon JH-HLM goals.    Patient Centered Rounds: I performed bedside rounds with nursing staff today.  Discussions with Specialists or Other Care Team Provider:  IP CONSULT TO PHARMACY  IP CONSULT TO GASTROENTEROLOGY  IP CONSULT TO VENOUS ACCESS  TEAM  IP CONSULT TO HEMATOLOGY      Education and Discussions with Family / Patient: Discussed current plan and likely next steps with patient at bedside - GI came in to consult.     Current Length of Stay: 0 day(s)  Current Patient Status: Observation   Certification Statement: The patient will continue to require additional inpatient hospital stay due to plan as noted above  Discharge Plan: Anticipate discharge in 48-72 hrs to home.    Code Status: Level 1 - Full Code    Subjective:   Feeling better today, no headaches and not feverish, able to bend and flex right leg without pain. Less erythema and decreased tenderness on palpation per patient. Does not feel fatigued or short of breath. Able to ambulate and normal appetite.     Objective:     Vitals:   Temp (24hrs), Av.4 °F (38 °C), Min:97.8 °F (36.6 °C), Max:102.9 °F (39.4 °C)    Temp:  [97.8 °F (36.6 °C)-102.9 °F (39.4 °C)] 97.9 °F (36.6 °C)  HR:  [] 65  Resp:  [20] 20  BP: (100-135)/(61-89) 112/68  SpO2:  [96 %-100 %] 100 %  Body mass index is 29.57 kg/m².     Input and Output Summary (last 24 hours):     Intake/Output Summary (Last 24 hours) at 2024 1548  Last data filed at 2024 0900  Gross per 24 hour   Intake 480 ml   Output 1600 ml   Net -1120 ml       Physical Exam:   Physical Exam  Vitals and nursing note reviewed.   Constitutional:       General: She is not in acute distress.     Appearance: She is well-developed.   HENT:      Head: Normocephalic and atraumatic.   Eyes:      Conjunctiva/sclera: Conjunctivae normal.   Cardiovascular:      Rate and Rhythm: Normal rate and regular rhythm.      Heart sounds: No murmur heard.  Pulmonary:      Effort: Pulmonary effort is normal. No respiratory distress.      Breath sounds: Normal breath sounds.   Abdominal:      Palpations: Abdomen is soft.      Tenderness: There is no abdominal tenderness.   Musculoskeletal:         General: No swelling.      Cervical back: Neck supple.      Comments:  Right leg erythema, warmth, and tenderness decreased from yesterday; good sensation, pulse, strength, capillary refill   Skin:     General: Skin is warm and dry.      Capillary Refill: Capillary refill takes less than 2 seconds.   Neurological:      Mental Status: She is alert.   Psychiatric:         Mood and Affect: Mood normal.          Additional Data:     Labs:  Results from last 7 days   Lab Units 07/09/24  0537 07/08/24  0531 07/08/24  0029   WBC Thousand/uL 10.84*   < > 7.88   HEMOGLOBIN g/dL 6.7*   < > 7.0*   HEMATOCRIT % 23.2*   < > 24.2*   PLATELETS Thousands/uL 358   < > 368   BANDS PCT % 3  --   --    SEGS PCT %  --   --  71   LYMPHO PCT % 6  --  17   MONO PCT % 1*  --  9   EOS PCT %  --   --  3    < > = values in this interval not displayed.     Results from last 7 days   Lab Units 07/09/24  0537   SODIUM mmol/L 138   POTASSIUM mmol/L 3.3*   CHLORIDE mmol/L 104   CO2 mmol/L 27   BUN mg/dL 7   CREATININE mg/dL 0.68   ANION GAP mmol/L 7   CALCIUM mg/dL 8.3*   ALBUMIN g/dL 3.6   TOTAL BILIRUBIN mg/dL 0.30   ALK PHOS U/L 79   ALT U/L 19   AST U/L 16   GLUCOSE RANDOM mg/dL 110                 Results from last 7 days   Lab Units 07/08/24  0029   LACTIC ACID mmol/L 0.8       Lines/Drains:  Invasive Devices       Peripheral Intravenous Line  Duration             Long-Dwell Peripheral IV (Midline) 07/09/24 Left Basilic <1 day                          Imaging: Reviewed radiology reports from this admission including:   CT lower extremity wo contrast right    Result Date: 7/8/2024  Impression: No evidence of an abscess; however, please note that evaluation is limited without intravenous contrast material. Nonspecific posterior subcutaneous edema. Workstation performed: [id number]     XR knee 4+ views Right injury    Result Date: 7/8/2024  Impression: No acute osseous abnormality. Computerized Assisted Algorithm (CAA) may have been used to analyze all applicable images. Workstation performed: [id number]        No Chest XR results available for this patient.     No results found for this or any previous visit.      Recent Cultures (last 7 days):         Last 24 Hours Medication List:   Current Facility-Administered Medications   Medication Dose Route Frequency Provider Last Rate    acetaminophen  650 mg Oral Q6H PRN Omid Leslie MD      cefepime  1,000 mg Intravenous Q12H Sol Colin MD 1,000 mg (07/09/24 0011)    [START ON 7/10/2024] cyanocobalamin  1,000 mcg Oral Daily Nahomy Cheatham PA-C      cyanocobalamin  1,000 mcg Intramuscular Once Nahomy Cheatham PA-C      enoxaparin  40 mg Subcutaneous Daily Omid Leslie MD      folic acid  1 mg Oral Daily Antione Cordoba MD      iron sucrose  200 mg Intravenous Daily Antione Cordoba  mg (07/09/24 1254)    morphine injection  4 mg Intravenous Q4H PRN Omid Leslie MD      oxyCODONE  5 mg Oral Q4H PRN Omid Leslie MD      pantoprazole  40 mg Oral Early Morning Antione Cordoba MD      sodium chloride  75 mL/hr Intravenous Continuous Jake Lopez MD 75 mL/hr (07/09/24 1258)    vancomycin  1,250 mg Intravenous Q12H Omid Leslie MD 1,250 mg (07/09/24 1547)        Today, Patient Was Seen By: Jake Lopez MD       **Please Note: This note may have been constructed using a voice recognition system.**

## 2024-07-10 ENCOUNTER — DOCUMENTATION (OUTPATIENT)
Dept: HEMATOLOGY ONCOLOGY | Facility: CLINIC | Age: 44
End: 2024-07-10

## 2024-07-10 ENCOUNTER — APPOINTMENT (OUTPATIENT)
Dept: GASTROENTEROLOGY | Facility: HOSPITAL | Age: 44
End: 2024-07-10
Payer: COMMERCIAL

## 2024-07-10 ENCOUNTER — ANESTHESIA (OUTPATIENT)
Dept: GASTROENTEROLOGY | Facility: HOSPITAL | Age: 44
End: 2024-07-10
Payer: COMMERCIAL

## 2024-07-10 ENCOUNTER — ANESTHESIA EVENT (OUTPATIENT)
Dept: GASTROENTEROLOGY | Facility: HOSPITAL | Age: 44
End: 2024-07-10
Payer: COMMERCIAL

## 2024-07-10 VITALS
SYSTOLIC BLOOD PRESSURE: 119 MMHG | BODY MASS INDEX: 29.07 KG/M2 | WEIGHT: 191.8 LBS | OXYGEN SATURATION: 100 % | DIASTOLIC BLOOD PRESSURE: 101 MMHG | HEART RATE: 62 BPM | RESPIRATION RATE: 19 BRPM | TEMPERATURE: 97.4 F | HEIGHT: 68 IN

## 2024-07-10 LAB
ABO GROUP BLD BPU: NORMAL
ANION GAP SERPL CALCULATED.3IONS-SCNC: 6 MMOL/L (ref 4–13)
BASOPHILS # BLD AUTO: 0.04 THOUSANDS/ÂΜL (ref 0–0.1)
BASOPHILS NFR BLD AUTO: 1 % (ref 0–1)
BLD SMEAR INTERP: NORMAL
BPU ID: NORMAL
BUN SERPL-MCNC: 3 MG/DL (ref 5–25)
CALCIUM SERPL-MCNC: 8.9 MG/DL (ref 8.4–10.2)
CHLORIDE SERPL-SCNC: 107 MMOL/L (ref 96–108)
CO2 SERPL-SCNC: 26 MMOL/L (ref 21–32)
CREAT SERPL-MCNC: 0.68 MG/DL (ref 0.6–1.3)
CROSSMATCH: NORMAL
EOSINOPHIL # BLD AUTO: 0.16 THOUSAND/ÂΜL (ref 0–0.61)
EOSINOPHIL NFR BLD AUTO: 2 % (ref 0–6)
ERYTHROCYTE [DISTWIDTH] IN BLOOD BY AUTOMATED COUNT: 21.9 % (ref 11.6–15.1)
GFR SERPL CREATININE-BSD FRML MDRD: 107 ML/MIN/1.73SQ M
GLUCOSE P FAST SERPL-MCNC: 86 MG/DL (ref 65–99)
GLUCOSE SERPL-MCNC: 86 MG/DL (ref 65–140)
HCT VFR BLD AUTO: 27.7 % (ref 34.8–46.1)
HGB BLD-MCNC: 8.2 G/DL (ref 11.5–15.4)
IMM GRANULOCYTES # BLD AUTO: 0.07 THOUSAND/UL (ref 0–0.2)
IMM GRANULOCYTES NFR BLD AUTO: 1 % (ref 0–2)
LYMPHOCYTES # BLD AUTO: 1.34 THOUSANDS/ÂΜL (ref 0.6–4.47)
LYMPHOCYTES NFR BLD AUTO: 18 % (ref 14–44)
MCH RBC QN AUTO: 19.2 PG (ref 26.8–34.3)
MCHC RBC AUTO-ENTMCNC: 29.6 G/DL (ref 31.4–37.4)
MCV RBC AUTO: 65 FL (ref 82–98)
MONOCYTES # BLD AUTO: 0.75 THOUSAND/ÂΜL (ref 0.17–1.22)
MONOCYTES NFR BLD AUTO: 10 % (ref 4–12)
NEUTROPHILS # BLD AUTO: 5.25 THOUSANDS/ÂΜL (ref 1.85–7.62)
NEUTS SEG NFR BLD AUTO: 68 % (ref 43–75)
NRBC BLD AUTO-RTO: 0 /100 WBCS
PLATELET # BLD AUTO: 382 THOUSANDS/UL (ref 149–390)
PMV BLD AUTO: 9.5 FL (ref 8.9–12.7)
POTASSIUM SERPL-SCNC: 3.6 MMOL/L (ref 3.5–5.3)
RBC # BLD AUTO: 4.27 MILLION/UL (ref 3.81–5.12)
SODIUM SERPL-SCNC: 139 MMOL/L (ref 135–147)
UNIT DISPENSE STATUS: NORMAL
UNIT PRODUCT CODE: NORMAL
UNIT PRODUCT VOLUME: 350 ML
UNIT RH: NORMAL
WBC # BLD AUTO: 7.61 THOUSAND/UL (ref 4.31–10.16)

## 2024-07-10 PROCEDURE — 45378 DIAGNOSTIC COLONOSCOPY: CPT | Performed by: INTERNAL MEDICINE

## 2024-07-10 PROCEDURE — 80048 BASIC METABOLIC PNL TOTAL CA: CPT | Performed by: ANESTHESIOLOGY

## 2024-07-10 PROCEDURE — 82955 ASSAY OF G6PD ENZYME: CPT | Performed by: PHYSICIAN ASSISTANT

## 2024-07-10 PROCEDURE — 85025 COMPLETE CBC W/AUTO DIFF WBC: CPT | Performed by: ANESTHESIOLOGY

## 2024-07-10 PROCEDURE — 99232 SBSQ HOSP IP/OBS MODERATE 35: CPT | Performed by: PHYSICIAN ASSISTANT

## 2024-07-10 PROCEDURE — 88305 TISSUE EXAM BY PATHOLOGIST: CPT | Performed by: PATHOLOGY

## 2024-07-10 PROCEDURE — 43239 EGD BIOPSY SINGLE/MULTIPLE: CPT | Performed by: INTERNAL MEDICINE

## 2024-07-10 PROCEDURE — NC001 PR NO CHARGE: Performed by: PHYSICIAN ASSISTANT

## 2024-07-10 PROCEDURE — 99239 HOSP IP/OBS DSCHRG MGMT >30: CPT | Performed by: INTERNAL MEDICINE

## 2024-07-10 RX ORDER — SODIUM CHLORIDE, SODIUM LACTATE, POTASSIUM CHLORIDE, CALCIUM CHLORIDE 600; 310; 30; 20 MG/100ML; MG/100ML; MG/100ML; MG/100ML
INJECTION, SOLUTION INTRAVENOUS CONTINUOUS PRN
Status: DISCONTINUED | OUTPATIENT
Start: 2024-07-10 | End: 2024-07-10

## 2024-07-10 RX ORDER — PROPOFOL 10 MG/ML
INJECTION, EMULSION INTRAVENOUS AS NEEDED
Status: DISCONTINUED | OUTPATIENT
Start: 2024-07-10 | End: 2024-07-10

## 2024-07-10 RX ORDER — FERROUS SULFATE 324(65)MG
324 TABLET, DELAYED RELEASE (ENTERIC COATED) ORAL
Qty: 30 TABLET | Refills: 0 | Status: SHIPPED | OUTPATIENT
Start: 2024-07-10 | End: 2024-08-09

## 2024-07-10 RX ORDER — SULFAMETHOXAZOLE AND TRIMETHOPRIM 800; 160 MG/1; MG/1
1 TABLET ORAL EVERY 12 HOURS SCHEDULED
Qty: 10 TABLET | Refills: 0 | Status: SHIPPED | OUTPATIENT
Start: 2024-07-10 | End: 2024-07-15

## 2024-07-10 RX ORDER — LIDOCAINE HYDROCHLORIDE 20 MG/ML
INJECTION, SOLUTION EPIDURAL; INFILTRATION; INTRACAUDAL; PERINEURAL AS NEEDED
Status: DISCONTINUED | OUTPATIENT
Start: 2024-07-10 | End: 2024-07-10

## 2024-07-10 RX ORDER — PROPOFOL 10 MG/ML
INJECTION, EMULSION INTRAVENOUS CONTINUOUS PRN
Status: DISCONTINUED | OUTPATIENT
Start: 2024-07-10 | End: 2024-07-10

## 2024-07-10 RX ORDER — FOLIC ACID 1 MG/1
1 TABLET ORAL DAILY
Qty: 30 TABLET | Refills: 0 | Status: SHIPPED | OUTPATIENT
Start: 2024-07-11 | End: 2024-08-10

## 2024-07-10 RX ADMIN — CYANOCOBALAMIN TAB 500 MCG 1000 MCG: 500 TAB at 12:32

## 2024-07-10 RX ADMIN — PANTOPRAZOLE SODIUM 40 MG: 40 TABLET, DELAYED RELEASE ORAL at 05:50

## 2024-07-10 RX ADMIN — SODIUM CHLORIDE, SODIUM LACTATE, POTASSIUM CHLORIDE, AND CALCIUM CHLORIDE: .6; .31; .03; .02 INJECTION, SOLUTION INTRAVENOUS at 10:38

## 2024-07-10 RX ADMIN — PROPOFOL 140 MG: 10 INJECTION, EMULSION INTRAVENOUS at 10:39

## 2024-07-10 RX ADMIN — IRON SUCROSE 200 MG: 20 INJECTION, SOLUTION INTRAVENOUS at 12:30

## 2024-07-10 RX ADMIN — CEFEPIME 1000 MG: 2 INJECTION, POWDER, FOR SOLUTION INTRAVENOUS at 01:16

## 2024-07-10 RX ADMIN — LIDOCAINE HYDROCHLORIDE 100 MG: 20 INJECTION, SOLUTION EPIDURAL; INFILTRATION; INTRACAUDAL; PERINEURAL at 10:39

## 2024-07-10 RX ADMIN — FOLIC ACID 1 MG: 1 TABLET ORAL at 12:32

## 2024-07-10 RX ADMIN — VANCOMYCIN HYDROCHLORIDE 1250 MG: 5 INJECTION, POWDER, LYOPHILIZED, FOR SOLUTION INTRAVENOUS at 06:06

## 2024-07-10 RX ADMIN — PROPOFOL 150 MCG/KG/MIN: 10 INJECTION, EMULSION INTRAVENOUS at 10:40

## 2024-07-10 RX ADMIN — ENOXAPARIN SODIUM 40 MG: 40 INJECTION SUBCUTANEOUS at 12:32

## 2024-07-10 RX ADMIN — PROPOFOL 40 MG: 10 INJECTION, EMULSION INTRAVENOUS at 10:44

## 2024-07-10 NOTE — ASSESSMENT & PLAN NOTE
Most likely iron deficiency anemia.  Patient has always been chronically anemic and has very heavy menses, also noted by her gynecologist. Last menses ended on Saturday 7/7. Venofer infusions were ordered  Normal reticulocyte count and percent; iron panel with low iron, ferritin, folate, and normal TIBC.   Hemoglobin was 6.7 with MCV of 62 on 7/9/24 - patient was transfused without further complication  Hematology consulted 7/9, will continue with Venofer and starting folate and cyanocobalamin supplements   GI consulted, EGD/colonoscopy were normal on 7/10  Hb improved to 8.2 following transfusion with MCV of 65  Hematology thinks this is most likely iron deficiency secondary to menorrhagia - will continue iron supplements, cyanocobalamin, and folate supplementation, and have outpatient hematology follow-up for continued IV iron therapy. If anemia doesn't improve, will do further workup and possible bone marrow biopsy. Patient is amenable to this plan  Patient will also follow up with gynecologist  Following transfusion, schistocytes were seen on hemolysis smear - appreciate hematology recommendations. Bilirubin and platelets have been normal.

## 2024-07-10 NOTE — ASSESSMENT & PLAN NOTE
Status post I&D on July 4, 2024 by the ER.  There are no wound cultures.  Patient was sent home on Keflex, now with worsening erythema  CT on 7/8 showed no abscess   WBC count of 7.61 on 7/10  Patient was on vancomycin and cefepime - will continue outpatient with TMP-SMX   Patient may follow-up with PCP for further outpatient evaluation of cellulitis resolution

## 2024-07-10 NOTE — DISCHARGE SUMMARY
[facility]  Discharge- Celeste Coffman 1980, 43 y.o. female MRN: [id number]  Unit/Bed#: MS 329[facility]01 Encounter: [id number]  Primary Care Provider: Anais Hays   Date and time admitted to hospital: 7/7/2024 11:33 PM      Admitting Provider:  Omid Leslie MD  Discharge Provider:  Jake Lopez MD  Admission Date: 7/7/2024       Discharge Date: 07/10/24   LOS: 0  Primary Care Physician at Discharge: Anais Hays [phone]    HOSPITAL COURSE:  Celeste Coffman is a 43 y.o. female who presented with cellulitis of the right leg with abscess.  Patient was treated for the same with intravenous antibiotics and improved.  Being discharged on oral Bactrim to complete antibiotic course.  Patient told to follow-up with her PCP.  Patient also diagnosed with anemia and severe iron deficiency.  She is told to follow-up with her own GYN, PCP and hematologist.  Patient verbalized understanding of the plan.  Patient did have GI workup with endoscopy and colonoscopy that showed no evidence of any GI bleed.  She does have heavy menstrual period that may be causing the anemia    Mobility:   Basic Mobility Inpatient Raw Score: 24  JH-HLM Goal: 8: Walk 250 feet or more  JH-HLM Achieved: 7: Walk 25 feet or more  JH-HLM Goal achieved. Continue to encourage appropriate mobility.    REASON FOR ADMISSION/ ADMISSION DIAGNOSES    Right leg cellulitis, anemia    DISCHARGE DIAGNOSES  Iron deficiency anemia secondary to inadequate dietary iron intake  Assessment & Plan    Most likely iron deficiency anemia.  Patient has always been chronically anemic and has very heavy menses, also noted by her gynecologist. Last menses ended on Saturday 7/7. Venofer infusions were ordered  Normal reticulocyte count and percent; iron panel with low iron, ferritin, folate, and normal TIBC.   Hemoglobin was 6.7 with MCV of 62 on 7/9/24 - patient was transfused without further  complication  Hematology consulted 7/9, will continue with Venofer and starting folate and cyanocobalamin supplements   GI consulted, EGD/colonoscopy were normal on 7/10  Hb improved to 8.2 following transfusion with MCV of 65  Hematology thinks this is most likely iron deficiency secondary to menorrhagia - will continue iron supplements, cyanocobalamin, and folate supplementation, and have outpatient hematology follow-up for continued IV iron therapy. If anemia doesn't improve, will do further workup and possible bone marrow biopsy. Patient is amenable to this plan  Patient will also follow up with gynecologist  Following transfusion, schistocytes were seen on hemolysis smear - appreciate hematology recommendations. Bilirubin and platelets have been normal.     * Cellulitis of right lower extremity  Assessment & Plan  Status post I&D on July 4, 2024 by the ER.  There are no wound cultures.  Patient was sent home on Keflex, now with worsening erythema  CT on 7/8 showed no abscess   WBC count of 7.61 on 7/10  Patient was on vancomycin and cefepime - will continue outpatient with TMP-SMX   Patient may follow-up with PCP for further outpatient evaluation of cellulitis resolution       CONSULTING PROVIDERS   IP CONSULT TO PHARMACY  IP CONSULT TO GASTROENTEROLOGY  IP CONSULT TO VENOUS ACCESS TEAM  IP CONSULT TO HEMATOLOGY    PROCEDURES PERFORMED  * No surgery found *    RADIOLOGY RESULTS  Colonoscopy    Result Date: 7/10/2024  Impression: The cecum, ascending colon, hepatic flexure, transverse colon, splenic flexure, descending colon, sigmoid colon, rectosigmoid and rectum appeared normal. RECOMMENDATION: Repeat screening colonoscopy in 10 years, due: 7/8/2034 GYN evaluation and treatment for menorrhagia Iron supplementation At the present time I will sign off please reconsult as needed  Roberth Yarbrough III, MD     EGD    Result Date: 7/10/2024  Impression: Normal endoscopy RECOMMENDATION: Await pathology results    "Roberth Yarbrough III, MD     CT lower extremity wo contrast right    Result Date: 7/8/2024  Impression: No evidence of an abscess; however, please note that evaluation is limited without intravenous contrast material. Nonspecific posterior subcutaneous edema. Workstation performed: [id number]     XR knee 4+ views Right injury    Result Date: 7/8/2024  Impression: No acute osseous abnormality. Computerized Assisted Algorithm (CAA) may have been used to analyze all applicable images. Workstation performed: [id number]       LABS  Results from last 7 days   Lab Units 07/10/24  0958 07/09/24  0537 07/08/24  0531 07/08/24  0029   WBC Thousand/uL 7.61 10.84* 6.00 7.88   HEMOGLOBIN g/dL 8.2* 6.7* 7.0* 7.0*   HEMATOCRIT % 27.7* 23.2* 24.9* 24.2*   MCV fL 65* 62* 63* 63*   BANDS PCT %  --  3  --   --    PLATELETS Thousands/uL 382 358 359 368     Results from last 7 days   Lab Units 07/10/24  0958 07/09/24  0537 07/08/24  0531 07/08/24  0029   SODIUM mmol/L 139 138 137 136   POTASSIUM mmol/L 3.6 3.3* 3.4* 3.6   CHLORIDE mmol/L 107 104 104 103   CO2 mmol/L 26 27 26 25   BUN mg/dL 3* 7 13 16   CREATININE mg/dL 0.68 0.68 0.69 0.83   CALCIUM mg/dL 8.9 8.3* 8.5 9.1   ALBUMIN g/dL  --  3.6  --   --    TOTAL BILIRUBIN mg/dL  --  0.30  --   --    ALK PHOS U/L  --  79  --   --    ALT U/L  --  19  --   --    AST U/L  --  16  --   --    EGFR ml/min/1.73sq m 107 107 106 86   GLUCOSE RANDOM mg/dL 86 110 84 88                              Results from last 7 days   Lab Units 07/08/24  0029   LACTIC ACID mmol/L 0.8           Cultures:                   PHYSICAL EXAM:  Vitals:   Blood Pressure: (!) 119/101 (07/10/24 1215)  Pulse: 62 (07/10/24 1215)  Temperature: (!) 97.4 °F (36.3 °C) (07/10/24 1140)  Temp Source: Temporal (07/10/24 1059)  Respirations: 19 (07/10/24 1119)  Height: 5' 8\" (172.7 cm) (07/08/24 0201)  Weight - Scale: 87 kg (191 lb 12.8 oz) (07/10/24 0600)  SpO2: 100 % (07/10/24 1215)    General appearance: alert, appears " stated age, and cooperative  HEENT - atraumatic and normocephalic  Neck- supple  Skin - no fresh rash  Extremities no fresh focal deformities  Cardiovascular- S1-S2 heard  Respiratory- bilateral air entry present, no crackles or rhonchi  Skin - no fresh rash  Abdomen - normal bowel sounds present, no rebound tenderness  CNS- No fresh focal deficits  Psych- no acute psychosis     Planned Re-admission: No  Discharge Disposition: Home/Self Care    Test Results Pending at Discharge:   Pending Labs       Order Current Status    Glucose 6 phosphate dehydrogenase In process    Haptoglobin In process    Tissue Exam In process        Incidental findings: None    Medications   Summary of Medication Adjustments made as a result of this hospitalization: Iron, B12 and folate  Medication Dosing Tapers - Please refer to Discharge Medication List for details on any medication dosing tapers (if applicable to patient).  Discharge Medication List: See after visit summary for reconciled discharge medications.     Diet restrictions: Healthy diet       Diet Orders   (From admission, onward)                 Start     Ordered    07/10/24 1153  Diet Regular; Regular House  Diet effective now        References:    Adult Nutrition Support Algorithm    RD Therapeutic Diet Order Protocol   Question Answer Comment   Diet Type Regular    Regular Regular House    RD to adjust diet per protocol? Yes        07/10/24 1152                  Activity restrictions: No strenuous activity  Discharge Condition: stable    Outpatient Follow-Up and Discharge Instructions  See after visit summary section titled Discharge Instructions for information provided to patient and family.      Code Status: Level 1 - Full Code  Discharge Statement   I spent 35 minutes discharging the patient. This time was spent on the day of discharge. Greater than 50% of total time was spent with the patient and / or family counseling and / or coordination of care.    Jake  MD John  [facility]    ** Please Note: This note has been constructed using a voice recognition system. **

## 2024-07-10 NOTE — ANESTHESIA POSTPROCEDURE EVALUATION
Post-Op Assessment Note    CV Status:  Stable    Pain management: adequate       Mental Status:  Sleepy   Hydration Status:  Euvolemic   PONV Controlled:  Controlled   Airway Patency:  Patent     Post Op Vitals Reviewed: Yes    No anethesia notable event occurred.    Staff: CRNA               BP   90/60   Temp 97.6   Pulse 71   Resp 12   SpO2 96

## 2024-07-10 NOTE — PROGRESS NOTES
Celeste Coffman is a 43 y.o. female who is currently ordered Vancomycin IV with management by the Pharmacy Consult service.  Relevant clinical data and objective / subjective history reviewed.  Vancomycin Assessment:  Indication and Goal AUC/Trough: Soft tissue (goal -600, trough >10), -600, trough >10  Clinical Status: stable  Micro:     Renal Function:  SCr: 0.68 mg/dL  CrCl: 123.1 mL/min  Renal replacement: Not on dialysis  Days of Therapy: 3  Current Dose: 1250mg IV q12h  Vancomycin Plan:  New Dosing: Continue 1250mg IV q12h  Estimated AUC: 422 mcg*hr/mL  Estimated Trough: 10.4 mcg/mL  Next Level: 7/11 AM level  Renal Function Monitoring: Daily BMP and UOP  Pharmacy will continue to follow closely for s/sx of nephrotoxicity, infusion reactions and appropriateness of therapy.  BMP and CBC will be ordered per protocol. We will continue to follow the patient’s culture results and clinical progress daily.    Bryan Reyes, Pharmacist

## 2024-07-10 NOTE — CASE MANAGEMENT
Case Management Discharge Planning Note    Patient name Celeste Martinez [facility]  Location /-01 MRN [id number]  : 1980 Date 7/10/2024       Current Admission Date: 2024  Current Admission Diagnosis:Cellulitis of right lower extremity   Patient Active Problem List    Diagnosis Date Noted Date Diagnosed    Iron deficiency anemia secondary to inadequate dietary iron intake 2024     Cellulitis of right lower extremity 2024       LOS (days): 0  Geometric Mean LOS (GMLOS) (days):   Days to GMLOS:     OBJECTIVE:            Current admission status: Observation   [id number] [address] [address]  Phone: [phone] Fax: [phone]    Primary Care Provider: Anais Hays    Primary Insurance: BLUE CROSS  Secondary Insurance:     DISCHARGE DETAILS:                                          Other Referral/Resources/Interventions Provided:  Interventions: Transportation  Referral Comments: CM was notified by hospitalist that pt will need transportation home. CM scheduled ride share transport for today at 1445. CM notified hospitalist, nurse, and pt.    Would you like to participate in our [facility] service program?  : No - Declined    Treatment Team Recommendation: Home  Discharge Destination Plan:: Home  Transport at Discharge : Ride Share  Dispatcher Contacted: Yes  Number/Name of Dispatcher: Deandre     ETA of Transport (Date): 07/10/24  ETA of Transport (Time): 1445

## 2024-07-10 NOTE — PLAN OF CARE
Problem: PAIN - ADULT  Goal: Verbalizes/displays adequate comfort level or baseline comfort level  Description: Interventions:  - Encourage patient to monitor pain and request assistance  - Assess pain using appropriate pain scale  - Administer analgesics based on type and severity of pain and evaluate response  - Implement non-pharmacological measures as appropriate and evaluate response  - Consider cultural and social influences on pain and pain management  - Notify physician/advanced practitioner if interventions unsuccessful or patient reports new pain  Outcome: Progressing     Problem: INFECTION - ADULT  Goal: Absence or prevention of progression during hospitalization  Description: INTERVENTIONS:  - Assess and monitor for signs and symptoms of infection  - Monitor lab/diagnostic results  - Monitor all insertion sites, i.e. indwelling lines, tubes, and drains  - Monitor endotracheal if appropriate and nasal secretions for changes in amount and color  - News[place] appropriate cooling/warming therapies per order  - Administer medications as ordered  - Instruct and encourage patient and family to use good hand hygiene technique  - Identify and instruct in appropriate isolation precautions for identified infection/condition  Outcome: Progressing  Goal: Absence of fever/infection during neutropenic period  Description: INTERVENTIONS:  - Monitor WBC    Outcome: Progressing     Problem: SKIN/TISSUE INTEGRITY - ADULT  Goal: Incision(s), wounds(s) or drain site(s) healing without S/S of infection  Description: INTERVENTIONS  - Assess and document dressing, incision, wound bed, drain sites and surrounding tissue  - Provide patient and family education    Outcome: Progressing

## 2024-07-10 NOTE — ANESTHESIA PREPROCEDURE EVALUATION
Procedure:  COLONOSCOPY  EGD    Relevant Problems   HEMATOLOGY   (+) Iron deficiency anemia secondary to inadequate dietary iron intake        Physical Exam    Airway    Mallampati score: II  TM Distance: >3 FB  Neck ROM: full     Dental   No notable dental hx     Cardiovascular  Rhythm: regular, Rate: normal    Pulmonary   Breath sounds clear to auscultation    Other Findings  post-pubertal.      Anesthesia Plan  ASA Score- 3     Anesthesia Type- IV sedation with anesthesia with ASA Monitors.         Additional Monitors:     Airway Plan:            Plan Factors-Exercise tolerance (METS): >4 METS.    Chart reviewed.   Existing labs reviewed. Patient summary reviewed.    Patient is not a current smoker.              Induction- intravenous.    Postoperative Plan-     Perioperative Resuscitation Plan - Level 1 - Full Code.       Informed Consent- Anesthetic plan and risks discussed with patient.  I personally reviewed this patient with the CRNA. Discussed and agreed on the Anesthesia Plan with the CRNA..

## 2024-07-10 NOTE — PROGRESS NOTES
Called [employer] at [phone].   Member ID: [id number]   Spoke with representative, Dorene.  4/1/23 Effective Date (presently effective)  In Network $2,000.00 Family OOP   In Network $0.00 Family OOP   $10 Copay   Verified the following NPI  (based on PT hx appts and location):  [place]: [address] [place] [address]   NPI: [id number]  Status: [place]: [address] [place] [address]   NPI: [id number]  Status: FATM[place]: [address] [place] [address]   NPI: [id number]  Status: OON    Call Reference #: [id number]      Marley Blum MPH  Phone: [phone]  Email: [email or web address]

## 2024-07-10 NOTE — PROGRESS NOTES
Medical Oncology/Hematology progress note  Celeste Coffman, female, 43 y.o., 1980,  /-[birth year], [id number]     Reason for admission: RLE cellulitis   Reason for consultation: Iron deficiency anemia       ASSESSMENT AND PLAN: 43yr old female presented to the hospital with RLE cellulitis and was incidentally found to have microcytic anemia with hgb 7g/dL and MCV 63. Labs consistent with iron, b12, and folate deficiency. EGD/colonoscopy unrevealing. SACHIN likely from menorrhagia.   Consider transfuse for hgb <7. Blood consent obtained at bedside.   IV Venofer 200mg daily x 3   Cyanocobalamin 1000 mcg daily   folic acid 1 mg daily   Will need outpatient hematology follow up for continued blood count monitoring and IV iron therapy. Unfortunately, we are out-of-network for her insurance. Patient advised to call her insurance company to establish with hematologist within her network.    Patient understands and is in agreement with this plan. Hematology/oncology off at this time.  Please do not hesitate to reach out to our team if any questions or concerns arise. Thank you for the opportunity to participate in this patient's care.      ECO    History of present illness: 43yr old female with no significant past medical history who presented to emergency room 2024 with RLE pain. Pain originally started on  which was the day after she returned from [facility]. She thought maybe you bit by something during travel. Was seen in ED on  and was started on keflex for RLE cellulitis and abscess. She returned  with increasing pain, edema, and erythema. Had fatigue, shortness of breath, lightheadedness, and dizziness yesterday at time of presentation. Labs revealed -> WBC 7.88, hemoglobin 7.0, MCV 63, platelets 368,000.  Iron 15, iron saturation 4%, TIBC 427, UIBC 412, ferritin 6.  Folate 5.7.  Vitamin B12 274.  Reticulocyte 0.9%.  There is limited data available in EMR for comparison.  Last  "CBC 06/13/2024 showed hemoglobin 7.1 with MCV of 63.  Remainder of CBC was unremarkable.  Prior to this her last labs were from 2022 where she has hemoglobin of 9 with associated microcytosis.  On further chart review she had labs from 2019 that showed hemoglobin of 11 with normal MCV.    Has monthly menstrual cycle. Bleeds for 5 days. The first 3 days are heaviest. She wears 2 super pads every 2 hours. No other bleeding or melena. Denies abdominal pain or early satiety. About 1 month ago she was seen in ED for back/chest pain. Work-up was negative and symptoms were attributed to GERD. She reports resolution in symptoms after 5 days of protonix. Did not continue therapy after 5 day Rx was completed. No history of bariatric surgery or known malabsorptive conditions. Not on any oral supplements. Has never been seen by hematology or required blood transfusion. Her sister has anemia from heavy vaginal bleeding. No know heart problems. No alcohol, drug or tobacco use. She does not consume much red meat, mostly consumes chicken, fish. She consumes a lot of vegetables.     Interval history: No acute complaints today.  Feels better after receiving blood transfusion.  Tolerating iron well.    Review of Systems:   Review of Systems   Constitutional:  Negative for fever.        No night sweats   Respiratory:  Negative for chest tightness and shortness of breath.    Gastrointestinal:         No blacks stools     Neurological:  Negative for dizziness and light-headedness.   All other systems reviewed and are negative.        PHYSICAL EXAM:    BP (!) 119/101   Pulse 62   Temp (!) 97.4 °F (36.3 °C)   Resp 19   Ht 5' 8\" (1.727 m)   Wt 87 kg (191 lb 12.8 oz)   LMP 07/03/2024 (Exact Date)   SpO2 100%   BMI 29.16 kg/m²     Physical Exam  Vitals reviewed.   HENT:      Head: Normocephalic.   Cardiovascular:      Rate and Rhythm: Normal rate and regular rhythm.   Pulmonary:      Effort: Pulmonary effort is normal.      Breath " sounds: Normal breath sounds.   Abdominal:      Palpations: Abdomen is soft.      Tenderness: There is no abdominal tenderness.   Musculoskeletal:      Cervical back: Neck supple.   Lymphadenopathy:      Cervical: No cervical adenopathy.   Skin:     Findings: No rash.   Neurological:      Mental Status: She is alert.         LABS:     Recent Results (from the past 48 hour(s))   Vancomycin, random    Collection Time: 07/09/24  5:37 AM   Result Value Ref Range    Vancomycin Rm 15.2 10.0 - 20.0 ug/mL   Basic metabolic panel    Collection Time: 07/09/24  5:37 AM   Result Value Ref Range    Sodium 138 135 - 147 mmol/L    Potassium 3.3 (L) 3.5 - 5.3 mmol/L    Chloride 104 96 - 108 mmol/L    CO2 27 21 - 32 mmol/L    ANION GAP 7 4 - 13 mmol/L    BUN 7 5 - 25 mg/dL    Creatinine 0.68 0.60 - 1.30 mg/dL    Glucose 110 65 - 140 mg/dL    Calcium 8.3 (L) 8.4 - 10.2 mg/dL    eGFR 107 ml/min/1.73sq m   CBC    Collection Time: 07/09/24  5:37 AM   Result Value Ref Range    WBC 10.84 (H) 4.31 - 10.16 Thousand/uL    RBC 3.72 (L) 3.81 - 5.12 Million/uL    Hemoglobin 6.7 (L) 11.5 - 15.4 g/dL    Hematocrit 23.2 (L) 34.8 - 46.1 %    MCV 62 (L) 82 - 98 fL    MCH 18.0 (L) 26.8 - 34.3 pg    MCHC 28.9 (L) 31.4 - 37.4 g/dL    RDW 19.8 (H) 11.6 - 15.1 %    Platelets 358 149 - 390 Thousands/uL    MPV 9.7 8.9 - 12.7 fL   Hepatic function panel    Collection Time: 07/09/24  5:37 AM   Result Value Ref Range    Total Bilirubin 0.30 0.20 - 1.00 mg/dL    Bilirubin, Direct 0.09 0.00 - 0.20 mg/dL    Alkaline Phosphatase 79 34 - 104 U/L    AST 16 13 - 39 U/L    ALT 19 7 - 52 U/L    Total Protein 6.8 6.4 - 8.4 g/dL    Albumin 3.6 3.5 - 5.0 g/dL   Peripheral Smear    Collection Time: 07/09/24  5:37 AM   Result Value Ref Range    Total Counted 100     Segmented % 90 %    Bands % 3 0 - 8 %    Lymphocytes % 6 %    Monocytes % 1 (L) 4 - 12 %    Absolute Neutrophils 10.08 (H) 1.81 - 6.82 Thousand/uL    Absolute Lymphocytes 0.65 0.60 - 4.47 Thousand/uL     Absolute Monocytes 0.11 0.00 - 1.22 Thousand/uL    RBC Morphology Present     Platelet Estimate Increased (A) Adequate    Anisocytosis Present     Hypochromia Present     Ovalocytes Present     Polychromasia Present     Schistocytes Present     Target Cells Present    Lactate dehydrogenase    Collection Time: 07/09/24  5:37 AM   Result Value Ref Range     140 - 271 U/L   Type and screen    Collection Time: 07/09/24  7:11 PM   Result Value Ref Range    ABO Grouping A     Rh Factor Positive     Antibody Screen Negative     Specimen Expiration Date [id number]    ABORh Recheck - Contact Blood Bank Prior to Collection    Collection Time: 07/09/24  8:13 PM   Result Value Ref Range    ABO Grouping A     Rh Factor Positive    Occult blood 1-3, stool    Collection Time: 07/09/24 11:18 PM   Result Value Ref Range    Fecal Occult Blood Diagnostic Negative Negative    Fecal Occult Blood Diagnostic 2 Negative Negative    Fecal Occult Blood Diagnostic 3 Negative Negative   Prepare Leukoreduced RBC: 1 Units    Collection Time: 07/10/24  4:10 AM   Result Value Ref Range    Unit Product Code L3224Q03     Unit Number [id number]     Unit ABO O     Unit RH POS     Crossmatch Compatible     Unit Dispense Status Presumed Trans     Unit Product Volume 350 mL   Hemolysis Smear    Collection Time: 07/10/24  9:58 AM   Result Value Ref Range    Hemolysis Smear Schistocytes noted    CBC and differential    Collection Time: 07/10/24  9:58 AM   Result Value Ref Range    WBC 7.61 4.31 - 10.16 Thousand/uL    RBC 4.27 3.81 - 5.12 Million/uL    Hemoglobin 8.2 (L) 11.5 - 15.4 g/dL    Hematocrit 27.7 (L) 34.8 - 46.1 %    MCV 65 (L) 82 - 98 fL    MCH 19.2 (L) 26.8 - 34.3 pg    MCHC 29.6 (L) 31.4 - 37.4 g/dL    RDW 21.9 (H) 11.6 - 15.1 %    MPV 9.5 8.9 - 12.7 fL    Platelets 382 149 - 390 Thousands/uL    nRBC 0 /100 WBCs    Segmented % 68 43 - 75 %    Immature Grans % 1 0 - 2 %    Lymphocytes % 18 14 - 44 %    Monocytes % 10 4 - 12 %     Eosinophils Relative 2 0 - 6 %    Basophils Relative 1 0 - 1 %    Absolute Neutrophils 5.25 1.85 - 7.62 Thousands/µL    Absolute Immature Grans 0.07 0.00 - 0.20 Thousand/uL    Absolute Lymphocytes 1.34 0.60 - 4.47 Thousands/µL    Absolute Monocytes 0.75 0.17 - 1.22 Thousand/µL    Eosinophils Absolute 0.16 0.00 - 0.61 Thousand/µL    Basophils Absolute 0.04 0.00 - 0.10 Thousands/µL   Basic metabolic panel    Collection Time: 07/10/24  9:58 AM   Result Value Ref Range    Sodium 139 135 - 147 mmol/L    Potassium 3.6 3.5 - 5.3 mmol/L    Chloride 107 96 - 108 mmol/L    CO2 26 21 - 32 mmol/L    ANION GAP 6 4 - 13 mmol/L    BUN 3 (L) 5 - 25 mg/dL    Creatinine 0.68 0.60 - 1.30 mg/dL    Glucose 86 65 - 140 mg/dL    Glucose, Fasting 86 65 - 99 mg/dL    Calcium 8.9 8.4 - 10.2 mg/dL    eGFR 107 ml/min/1.73sq m       CT lower extremity wo contrast right    Result Date: 7/8/2024  Narrative: CT RIGHT KNEE WITHOUT IV CONTRAST INDICATION: s/p I&D of right behind the knee abscess, returns with worsening redness and pain... r/o further abscess or infection behind the knee. COMPARISON: Right knee radiograph 7/8/2024 TECHNIQUE: CT examination of the above was performed. This examination, like all CT scans performed in the [facility], was performed utilizing techniques to minimize radiation dose exposure, including the use of iterative reconstruction and automated exposure control software.  Multiplanar 2D reformatted images were created from the source data. Rad dose  326 mGy-cm FINDINGS: OSSEOUS STRUCTURES:  No fracture, dislocation or destructive osseous lesion. Mild tricompartmental osteoarthritis, evidenced by partial-thickness articular cartilage loss and small marginal osteophytes. VISUALIZED MUSCULATURE:  Unremarkable. SOFT TISSUES: Posterior subcutaneous edema. No evidence of a focal fluid collection. OTHER PERTINENT FINDINGS:  None.     Impression: No evidence of an abscess; however, please note that evaluation  is limited without intravenous contrast material. Nonspecific posterior subcutaneous edema. Workstation performed: [id number]     XR knee 4+ views Right injury    Result Date: 7/8/2024  Narrative: XR KNEE 4+ VW RIGHT INJURY INDICATION: pain. COMPARISON: None FINDINGS: No acute fracture or dislocation. No joint effusion. Mild degenerative changes. No lytic or blastic osseous lesion. Unremarkable soft tissues.     Impression: No acute osseous abnormality. Computerized Assisted Algorithm (CAA) may have been used to analyze all applicable images. Workstation performed: [id number]     XR chest 2 views    Result Date: 6/13/2024  Narrative: XR CHEST PA & LATERAL INDICATION: Cardiac work-up. COMPARISON: None FINDINGS: Clear lungs. No pneumothorax or pleural effusion. Normal cardiomediastinal silhouette. Bones are unremarkable for age. Normal upper abdomen.     Impression: No acute cardiopulmonary disease. Workstation performed: [id number]         HISTORY:    History reviewed. No pertinent past medical history.    History reviewed. No pertinent surgical history.    History reviewed. No pertinent family history.    Social History     Socioeconomic History    Marital status: /Civil Union     Spouse name: None    Number of children: None    Years of education: None    Highest education level: None   Occupational History    None   Tobacco Use    Smoking status: Never    Smokeless tobacco: Never   Vaping Use    Vaping status: Never Used   Substance and Sexual Activity    Alcohol use: Not Currently    Drug use: Never    Sexual activity: None   Other Topics Concern    None   Social History Narrative    None     Social Determinants of Health     Financial Resource Strain: Not on file   Food Insecurity: No Food Insecurity (7/8/2024)    Hunger Vital Sign     Worried About Running Out of Food in the Last Year: Never true     Ran Out of Food in the Last Year: Never true   Transportation Needs: No Transportation Needs  (7/8/2024)    PRAPARE - Transportation     Lack of Transportation (Medical): No     Lack of Transportation (Non-Medical): No   Physical Activity: Not on file   Stress: Not on file   Social Connections: Not on file   Intimate Partner Violence: Not on file   Housing Stability: Low Risk  (7/8/2024)    Housing Stability Vital Sign     Unable to Pay for Housing in the Last Year: No     Number of Times Moved in the Last Year: 1     Homeless in the Last Year: No         Current Facility-Administered Medications:     acetaminophen (TYLENOL) tablet 650 mg, 650 mg, Oral, Q6H PRN, Roberth Yarbrough III, MD, 650 mg at 07/08/24 2010    cefepime (MAXIPIME) 1,000 mg in dextrose 5 % 50 mL IVPB, 1,000 mg, Intravenous, Q12H, Sol Colin MD, Last Rate: 100 mL/hr at 07/10/24 0116, 1,000 mg at 07/10/24 0116    cyanocobalamin (VITAMIN B-12) tablet 1,000 mcg, 1,000 mcg, Oral, Daily, Roberth Yarbrough III, MD, 1,000 mcg at 07/10/24 1232    enoxaparin (LOVENOX) subcutaneous injection 40 mg, 40 mg, Subcutaneous, Daily, Roberth Yarbrough III, MD, 40 mg at 07/10/24 1232    folic acid (FOLVITE) tablet 1 mg, 1 mg, Oral, Daily, Roberth Yarbrough III, MD, 1 mg at 07/10/24 1232    iron sucrose (VENOFER) 200 mg in sodium chloride 0.9 % 100 mL IVPB, 200 mg, Intravenous, Daily, Roberth Yarbrough III, MD, Last Rate: 100 mL/hr at 07/10/24 1230, 200 mg at 07/10/24 1230    morphine injection 4 mg, 4 mg, Intravenous, Q4H PRN, Roberth Yarbrough III, MD    oxyCODONE (ROXICODONE) IR tablet 5 mg, 5 mg, Oral, Q4H PRN, Roberth Yarbrough III, MD, 5 mg at 07/08/24 2052    pantoprazole (PROTONIX) EC tablet 40 mg, 40 mg, Oral, Early Morning, Roberth Yarbrough III, MD, 40 mg at 07/10/24 0550    vancomycin (VANCOCIN) 1,250 mg in sodium chloride 0.9 % 250 mL IVPB, 1,250 mg, Intravenous, Q12H, Roberth Yarbrough III, MD, Last Rate: 166.7 mL/hr at 07/10/24 0606, 1,250 mg at 07/10/24 0606      Medications Prior to Admission:     cephalexin (KEFLEX) 500 mg capsule     famotidine (PEPCID) 20 mg tablet    lidocaine (Lidoderm) 5 %    pantoprazole (PROTONIX) 20 mg tablet    TiZANidine (ZANAFLEX) 2 MG capsule    No Known Allergies    Labs and pertinent reports reviewed.      This note has been generated by voice recognition software system.  Therefore, there may be spelling, grammar, and or syntax errors. Please contact if questions arise.

## 2024-07-11 ENCOUNTER — TELEPHONE (OUTPATIENT)
Age: 44
End: 2024-07-11

## 2024-07-11 DIAGNOSIS — D50.8 IRON DEFICIENCY ANEMIA SECONDARY TO INADEQUATE DIETARY IRON INTAKE: Primary | ICD-10-CM

## 2024-07-11 LAB
G6PD BLD QN: 374 U/10E12 RBC (ref 127–427)
HAPTOGLOB SERPL-MCNC: 277 MG/DL (ref 42–296)
RBC # BLD AUTO: 4.48 X10E6/UL (ref 3.77–5.28)

## 2024-07-11 PROCEDURE — 88305 TISSUE EXAM BY PATHOLOGIST: CPT | Performed by: PATHOLOGY

## 2024-07-11 NOTE — TELEPHONE ENCOUNTER
Patient states seen inpatient in [place] and needed to follow in 1 week due to low hemoglobin. Patient appointment is on 08/28/2024 at 4:00 pm. Please call patient at [phone] if there is a sooner appointment available. Patient added to wait list. Patient would prefer [place] location

## 2024-07-12 NOTE — TELEPHONE ENCOUNTER
Patient notified of below.   Aware insurance is OON with our office and she must call her insurance to find a Hematologist in network.   Patient aware and agreeable that appointment will be canceled.